# Patient Record
Sex: FEMALE | Race: WHITE | Employment: UNEMPLOYED | URBAN - METROPOLITAN AREA
[De-identification: names, ages, dates, MRNs, and addresses within clinical notes are randomized per-mention and may not be internally consistent; named-entity substitution may affect disease eponyms.]

---

## 2017-01-25 PROBLEM — J45.20 MILD INTERMITTENT ASTHMA: Status: ACTIVE | Noted: 2017-01-25

## 2017-01-25 PROBLEM — Z72.0 TOBACCO ABUSE: Status: ACTIVE | Noted: 2017-01-25

## 2017-01-25 PROBLEM — F41.9 ANXIETY: Status: ACTIVE | Noted: 2017-01-25

## 2018-07-09 ENCOUNTER — HOSPITAL ENCOUNTER (EMERGENCY)
Age: 38
Discharge: HOME | DRG: 113 | End: 2018-07-10
Attending: EMERGENCY MEDICINE
Payer: COMMERCIAL

## 2018-07-09 DIAGNOSIS — Z72.0 TOBACCO ABUSE: ICD-10-CM

## 2018-07-09 DIAGNOSIS — J44.0 COPD WITH ACUTE BRONCHITIS (HCC): Primary | ICD-10-CM

## 2018-07-09 DIAGNOSIS — J20.9 COPD WITH ACUTE BRONCHITIS (HCC): Primary | ICD-10-CM

## 2018-07-09 LAB
EKG ATRIAL RATE: 101 BPM
EKG P AXIS: 40 DEGREES
EKG P-R INTERVAL: 192 MS
EKG Q-T INTERVAL: 346 MS
EKG QRS DURATION: 88 MS
EKG QTC CALCULATION (BAZETT): 448 MS
EKG R AXIS: 23 DEGREES
EKG T AXIS: 52 DEGREES
EKG VENTRICULAR RATE: 101 BPM

## 2018-07-09 PROCEDURE — 99285 EMERGENCY DEPT VISIT HI MDM: CPT

## 2018-07-09 PROCEDURE — 93005 ELECTROCARDIOGRAM TRACING: CPT | Performed by: EMERGENCY MEDICINE

## 2018-07-09 ASSESSMENT — PAIN DESCRIPTION - LOCATION
LOCATION: CHEST
LOCATION_2: HEAD

## 2018-07-09 ASSESSMENT — PAIN DESCRIPTION - PAIN TYPE: TYPE: ACUTE PAIN

## 2018-07-09 ASSESSMENT — PAIN DESCRIPTION - ORIENTATION
ORIENTATION_2: OTHER (COMMENT)
ORIENTATION: RIGHT;LEFT

## 2018-07-09 ASSESSMENT — PAIN DESCRIPTION - DURATION: DURATION_2: CONTINUOUS

## 2018-07-09 ASSESSMENT — PAIN SCALES - GENERAL: PAINLEVEL_OUTOF10: 10

## 2018-07-09 ASSESSMENT — PAIN DESCRIPTION - DESCRIPTORS
DESCRIPTORS: SHARP
DESCRIPTORS_2: STABBING

## 2018-07-09 ASSESSMENT — PAIN DESCRIPTION - FREQUENCY: FREQUENCY: INTERMITTENT

## 2018-07-10 ENCOUNTER — APPOINTMENT (OUTPATIENT)
Dept: GENERAL RADIOLOGY | Age: 38
DRG: 113 | End: 2018-07-10
Payer: COMMERCIAL

## 2018-07-10 VITALS
DIASTOLIC BLOOD PRESSURE: 73 MMHG | OXYGEN SATURATION: 94 % | BODY MASS INDEX: 30.23 KG/M2 | HEART RATE: 101 BPM | SYSTOLIC BLOOD PRESSURE: 115 MMHG | TEMPERATURE: 98.2 F | RESPIRATION RATE: 20 BRPM | WEIGHT: 144 LBS | HEIGHT: 58 IN

## 2018-07-10 LAB
ANION GAP SERPL CALCULATED.3IONS-SCNC: 16 MMOL/L (ref 7–16)
ANISOCYTOSIS: ABNORMAL
BASOPHILS ABSOLUTE: 0.13 E9/L (ref 0–0.2)
BASOPHILS RELATIVE PERCENT: 0.9 % (ref 0–2)
BUN BLDV-MCNC: 12 MG/DL (ref 6–20)
BURR CELLS: ABNORMAL
CALCIUM SERPL-MCNC: 8.9 MG/DL (ref 8.6–10.2)
CHLORIDE BLD-SCNC: 103 MMOL/L (ref 98–107)
CO2: 22 MMOL/L (ref 22–29)
CREAT SERPL-MCNC: 1 MG/DL (ref 0.5–1)
EOSINOPHILS ABSOLUTE: 0.54 E9/L (ref 0.05–0.5)
EOSINOPHILS RELATIVE PERCENT: 3.6 % (ref 0–6)
GFR AFRICAN AMERICAN: >60
GFR NON-AFRICAN AMERICAN: >60 ML/MIN/1.73
GLUCOSE BLD-MCNC: 111 MG/DL (ref 74–109)
HCT VFR BLD CALC: 38.3 % (ref 34–48)
HEMOGLOBIN: 12.4 G/DL (ref 11.5–15.5)
IMMATURE GRANULOCYTES #: 0.13 E9/L
IMMATURE GRANULOCYTES %: 0.9 % (ref 0–5)
LACTIC ACID: 1.9 MMOL/L (ref 0.5–2.2)
LYMPHOCYTES ABSOLUTE: 4.82 E9/L (ref 1.5–4)
LYMPHOCYTES RELATIVE PERCENT: 32.4 % (ref 20–42)
MAGNESIUM: 2.2 MG/DL (ref 1.6–2.6)
MCH RBC QN AUTO: 27.1 PG (ref 26–35)
MCHC RBC AUTO-ENTMCNC: 32.4 % (ref 32–34.5)
MCV RBC AUTO: 83.6 FL (ref 80–99.9)
MONOCYTES ABSOLUTE: 1.82 E9/L (ref 0.1–0.95)
MONOCYTES RELATIVE PERCENT: 12.2 % (ref 2–12)
NEUTROPHILS ABSOLUTE: 7.42 E9/L (ref 1.8–7.3)
NEUTROPHILS RELATIVE PERCENT: 50 % (ref 43–80)
OVALOCYTES: ABNORMAL
PDW BLD-RTO: 15.4 FL (ref 11.5–15)
PLATELET # BLD: 418 E9/L (ref 130–450)
PMV BLD AUTO: 9.7 FL (ref 7–12)
POIKILOCYTES: ABNORMAL
POLYCHROMASIA: ABNORMAL
POTASSIUM SERPL-SCNC: 3.8 MMOL/L (ref 3.5–5)
PRO-BNP: 32 PG/ML (ref 0–125)
RBC # BLD: 4.58 E12/L (ref 3.5–5.5)
SODIUM BLD-SCNC: 141 MMOL/L (ref 132–146)
TROPONIN: <0.01 NG/ML (ref 0–0.03)
WBC # BLD: 14.9 E9/L (ref 4.5–11.5)

## 2018-07-10 PROCEDURE — 83605 ASSAY OF LACTIC ACID: CPT

## 2018-07-10 PROCEDURE — 84484 ASSAY OF TROPONIN QUANT: CPT

## 2018-07-10 PROCEDURE — 36415 COLL VENOUS BLD VENIPUNCTURE: CPT

## 2018-07-10 PROCEDURE — 85025 COMPLETE CBC W/AUTO DIFF WBC: CPT

## 2018-07-10 PROCEDURE — 83880 ASSAY OF NATRIURETIC PEPTIDE: CPT

## 2018-07-10 PROCEDURE — 80048 BASIC METABOLIC PNL TOTAL CA: CPT

## 2018-07-10 PROCEDURE — 6370000000 HC RX 637 (ALT 250 FOR IP): Performed by: EMERGENCY MEDICINE

## 2018-07-10 PROCEDURE — 96374 THER/PROPH/DIAG INJ IV PUSH: CPT

## 2018-07-10 PROCEDURE — 83735 ASSAY OF MAGNESIUM: CPT

## 2018-07-10 PROCEDURE — 6360000002 HC RX W HCPCS: Performed by: EMERGENCY MEDICINE

## 2018-07-10 PROCEDURE — 94640 AIRWAY INHALATION TREATMENT: CPT

## 2018-07-10 PROCEDURE — 71045 X-RAY EXAM CHEST 1 VIEW: CPT

## 2018-07-10 RX ORDER — METHYLPREDNISOLONE SODIUM SUCCINATE 125 MG/2ML
125 INJECTION, POWDER, LYOPHILIZED, FOR SOLUTION INTRAMUSCULAR; INTRAVENOUS ONCE
Status: COMPLETED | OUTPATIENT
Start: 2018-07-10 | End: 2018-07-10

## 2018-07-10 RX ORDER — PREDNISONE 50 MG/1
TABLET ORAL
Qty: 5 TABLET | Refills: 0 | Status: ON HOLD | OUTPATIENT
Start: 2018-07-10 | End: 2018-07-17 | Stop reason: HOSPADM

## 2018-07-10 RX ORDER — IPRATROPIUM BROMIDE AND ALBUTEROL SULFATE 2.5; .5 MG/3ML; MG/3ML
3 SOLUTION RESPIRATORY (INHALATION) ONCE
Status: COMPLETED | OUTPATIENT
Start: 2018-07-10 | End: 2018-07-10

## 2018-07-10 RX ORDER — LEVOFLOXACIN 500 MG/1
500 TABLET, FILM COATED ORAL DAILY
Qty: 7 TABLET | Refills: 0 | Status: ON HOLD | OUTPATIENT
Start: 2018-07-10 | End: 2018-07-17 | Stop reason: HOSPADM

## 2018-07-10 RX ORDER — LEVOFLOXACIN 500 MG/1
500 TABLET, FILM COATED ORAL ONCE
Status: COMPLETED | OUTPATIENT
Start: 2018-07-10 | End: 2018-07-10

## 2018-07-10 RX ADMIN — LEVOFLOXACIN 500 MG: 500 TABLET, FILM COATED ORAL at 03:40

## 2018-07-10 RX ADMIN — METHYLPREDNISOLONE SODIUM SUCCINATE 125 MG: 125 INJECTION, POWDER, LYOPHILIZED, FOR SOLUTION INTRAMUSCULAR; INTRAVENOUS at 00:45

## 2018-07-10 RX ADMIN — IPRATROPIUM BROMIDE AND ALBUTEROL SULFATE 3 AMPULE: 2.5; .5 SOLUTION RESPIRATORY (INHALATION) at 00:20

## 2018-07-13 ENCOUNTER — HOSPITAL ENCOUNTER (INPATIENT)
Age: 38
LOS: 6 days | Discharge: HOME HEALTH | DRG: 113 | End: 2018-07-19
Attending: INTERNAL MEDICINE | Admitting: INTERNAL MEDICINE
Payer: COMMERCIAL

## 2018-07-13 DIAGNOSIS — Z72.0 TOBACCO ABUSE: ICD-10-CM

## 2018-07-13 DIAGNOSIS — F41.9 ANXIETY: ICD-10-CM

## 2018-07-13 DIAGNOSIS — J45.22 MILD INTERMITTENT ASTHMA WITH STATUS ASTHMATICUS: ICD-10-CM

## 2018-07-13 DIAGNOSIS — J45.902 ASTHMA WITH STATUS ASTHMATICUS, UNSPECIFIED ASTHMA SEVERITY, UNSPECIFIED WHETHER PERSISTENT: Primary | ICD-10-CM

## 2018-07-13 LAB
FILM ARRAY ADENOVIRUS: ABNORMAL
FILM ARRAY BORDETELLA PERTUSSIS: ABNORMAL
FILM ARRAY CHLAMYDOPHILIA PNEUMONIAE: ABNORMAL
FILM ARRAY CORONAVIRUS 229E: ABNORMAL
FILM ARRAY CORONAVIRUS HKU1: ABNORMAL
FILM ARRAY CORONAVIRUS NL63: ABNORMAL
FILM ARRAY CORONAVIRUS OC43: ABNORMAL
FILM ARRAY INFLUENZA A VIRUS 09H1: ABNORMAL
FILM ARRAY INFLUENZA A VIRUS H1: ABNORMAL
FILM ARRAY INFLUENZA A VIRUS H3: ABNORMAL
FILM ARRAY INFLUENZA A VIRUS: ABNORMAL
FILM ARRAY INFLUENZA B: ABNORMAL
FILM ARRAY METAPNEUMOVIRUS: ABNORMAL
FILM ARRAY MYCOPLASMA PNEUMONIAE: ABNORMAL
FILM ARRAY PARAINFLUENZA VIRUS 1: ABNORMAL
FILM ARRAY PARAINFLUENZA VIRUS 2: ABNORMAL
FILM ARRAY PARAINFLUENZA VIRUS 3: ABNORMAL
FILM ARRAY PARAINFLUENZA VIRUS 4: ABNORMAL
FILM ARRAY RESPIRATORY SYNCITIAL VIRUS: ABNORMAL
ORGANISM: ABNORMAL

## 2018-07-13 PROCEDURE — 2580000003 HC RX 258: Performed by: INTERNAL MEDICINE

## 2018-07-13 PROCEDURE — 6360000002 HC RX W HCPCS: Performed by: INTERNAL MEDICINE

## 2018-07-13 PROCEDURE — 94640 AIRWAY INHALATION TREATMENT: CPT

## 2018-07-13 PROCEDURE — 87502 INFLUENZA DNA AMP PROBE: CPT

## 2018-07-13 PROCEDURE — 6370000000 HC RX 637 (ALT 250 FOR IP): Performed by: INTERNAL MEDICINE

## 2018-07-13 PROCEDURE — 87486 CHLMYD PNEUM DNA AMP PROBE: CPT

## 2018-07-13 PROCEDURE — 82785 ASSAY OF IGE: CPT

## 2018-07-13 PROCEDURE — 87503 INFLUENZA DNA AMP PROB ADDL: CPT

## 2018-07-13 PROCEDURE — 94664 DEMO&/EVAL PT USE INHALER: CPT

## 2018-07-13 PROCEDURE — 87581 M.PNEUMON DNA AMP PROBE: CPT

## 2018-07-13 PROCEDURE — 87798 DETECT AGENT NOS DNA AMP: CPT

## 2018-07-13 PROCEDURE — 1200000000 HC SEMI PRIVATE

## 2018-07-13 RX ORDER — FLUOXETINE HYDROCHLORIDE 20 MG/1
20 CAPSULE ORAL DAILY
Status: DISCONTINUED | OUTPATIENT
Start: 2018-07-13 | End: 2018-07-20 | Stop reason: HOSPADM

## 2018-07-13 RX ORDER — ONDANSETRON 2 MG/ML
4 INJECTION INTRAMUSCULAR; INTRAVENOUS EVERY 6 HOURS PRN
Status: DISCONTINUED | OUTPATIENT
Start: 2018-07-13 | End: 2018-07-20 | Stop reason: HOSPADM

## 2018-07-13 RX ORDER — TOPIRAMATE 100 MG/1
100 TABLET, FILM COATED ORAL 2 TIMES DAILY
Status: DISCONTINUED | OUTPATIENT
Start: 2018-07-13 | End: 2018-07-20 | Stop reason: HOSPADM

## 2018-07-13 RX ORDER — PSEUDOEPHEDRINE HCL 120 MG/1
120 TABLET, FILM COATED, EXTENDED RELEASE ORAL 2 TIMES DAILY
Status: DISCONTINUED | OUTPATIENT
Start: 2018-07-13 | End: 2018-07-20 | Stop reason: HOSPADM

## 2018-07-13 RX ORDER — CETIRIZINE HYDROCHLORIDE 10 MG/1
5 TABLET ORAL 2 TIMES DAILY
Status: DISCONTINUED | OUTPATIENT
Start: 2018-07-13 | End: 2018-07-20 | Stop reason: HOSPADM

## 2018-07-13 RX ORDER — METHYLPREDNISOLONE SODIUM SUCCINATE 40 MG/ML
40 INJECTION, POWDER, LYOPHILIZED, FOR SOLUTION INTRAMUSCULAR; INTRAVENOUS DAILY
Status: DISCONTINUED | OUTPATIENT
Start: 2018-07-13 | End: 2018-07-15

## 2018-07-13 RX ORDER — SODIUM CHLORIDE 0.9 % (FLUSH) 0.9 %
10 SYRINGE (ML) INJECTION PRN
Status: DISCONTINUED | OUTPATIENT
Start: 2018-07-13 | End: 2018-07-20 | Stop reason: HOSPADM

## 2018-07-13 RX ORDER — SODIUM CHLORIDE 0.9 % (FLUSH) 0.9 %
10 SYRINGE (ML) INJECTION EVERY 12 HOURS SCHEDULED
Status: DISCONTINUED | OUTPATIENT
Start: 2018-07-13 | End: 2018-07-20 | Stop reason: HOSPADM

## 2018-07-13 RX ORDER — LAMOTRIGINE 100 MG/1
200 TABLET ORAL EVERY EVENING
Status: DISCONTINUED | OUTPATIENT
Start: 2018-07-13 | End: 2018-07-20 | Stop reason: HOSPADM

## 2018-07-13 RX ORDER — TRAZODONE HYDROCHLORIDE 150 MG/1
300 TABLET ORAL NIGHTLY
Status: DISCONTINUED | OUTPATIENT
Start: 2018-07-13 | End: 2018-07-20 | Stop reason: HOSPADM

## 2018-07-13 RX ORDER — LEVALBUTEROL INHALATION SOLUTION 0.63 MG/3ML
0.63 SOLUTION RESPIRATORY (INHALATION) 3 TIMES DAILY
Status: DISCONTINUED | OUTPATIENT
Start: 2018-07-13 | End: 2018-07-20 | Stop reason: HOSPADM

## 2018-07-13 RX ORDER — IPRATROPIUM BROMIDE AND ALBUTEROL SULFATE 2.5; .5 MG/3ML; MG/3ML
1 SOLUTION RESPIRATORY (INHALATION)
Status: DISCONTINUED | OUTPATIENT
Start: 2018-07-13 | End: 2018-07-13

## 2018-07-13 RX ORDER — DIAZEPAM 5 MG/1
5 TABLET ORAL 4 TIMES DAILY
Status: DISCONTINUED | OUTPATIENT
Start: 2018-07-13 | End: 2018-07-20 | Stop reason: HOSPADM

## 2018-07-13 RX ORDER — BUDESONIDE 0.5 MG/2ML
0.5 INHALANT ORAL 2 TIMES DAILY
Status: DISCONTINUED | OUTPATIENT
Start: 2018-07-13 | End: 2018-07-17

## 2018-07-13 RX ADMIN — TRAZODONE HYDROCHLORIDE 300 MG: 150 TABLET ORAL at 20:50

## 2018-07-13 RX ADMIN — DIAZEPAM 5 MG: 5 TABLET ORAL at 17:41

## 2018-07-13 RX ADMIN — TOPIRAMATE 100 MG: 100 TABLET, FILM COATED ORAL at 20:51

## 2018-07-13 RX ADMIN — METHYLPREDNISOLONE SODIUM SUCCINATE 40 MG: 40 INJECTION, POWDER, LYOPHILIZED, FOR SOLUTION INTRAMUSCULAR; INTRAVENOUS at 17:41

## 2018-07-13 RX ADMIN — PSEUDOEPHEDRINE HYDROCHLORIDE 120 MG: 120 TABLET, FILM COATED, EXTENDED RELEASE ORAL at 20:51

## 2018-07-13 RX ADMIN — CETIRIZINE HYDROCHLORIDE 5 MG: 10 TABLET, FILM COATED ORAL at 20:51

## 2018-07-13 RX ADMIN — IPRATROPIUM BROMIDE AND ALBUTEROL SULFATE 1 AMPULE: 2.5; .5 SOLUTION RESPIRATORY (INHALATION) at 17:16

## 2018-07-13 RX ADMIN — LAMOTRIGINE 200 MG: 100 TABLET ORAL at 17:39

## 2018-07-13 RX ADMIN — BUDESONIDE 500 MCG: 0.5 SUSPENSION RESPIRATORY (INHALATION) at 21:11

## 2018-07-13 RX ADMIN — FLUOXETINE HYDROCHLORIDE 20 MG: 20 CAPSULE ORAL at 17:39

## 2018-07-13 RX ADMIN — ENOXAPARIN SODIUM 40 MG: 40 INJECTION SUBCUTANEOUS at 17:41

## 2018-07-13 RX ADMIN — Medication 10 ML: at 21:15

## 2018-07-13 RX ADMIN — DIAZEPAM 5 MG: 5 TABLET ORAL at 20:50

## 2018-07-13 RX ADMIN — LEVALBUTEROL INHALATION 0.63MG/3ML 0.63 MG: 0.63 SOLUTION RESPIRATORY (INHALATION) at 21:12

## 2018-07-14 LAB
ANION GAP SERPL CALCULATED.3IONS-SCNC: 11 MMOL/L (ref 7–16)
ANISOCYTOSIS: ABNORMAL
BASOPHILS ABSOLUTE: 0 E9/L (ref 0–0.2)
BASOPHILS RELATIVE PERCENT: 0.3 % (ref 0–2)
BUN BLDV-MCNC: 19 MG/DL (ref 6–20)
BURR CELLS: ABNORMAL
CALCIUM SERPL-MCNC: 8.5 MG/DL (ref 8.6–10.2)
CHLORIDE BLD-SCNC: 105 MMOL/L (ref 98–107)
CO2: 23 MMOL/L (ref 22–29)
CREAT SERPL-MCNC: 0.8 MG/DL (ref 0.5–1)
EOSINOPHILS ABSOLUTE: 0 E9/L (ref 0.05–0.5)
EOSINOPHILS RELATIVE PERCENT: 0.1 % (ref 0–6)
GFR AFRICAN AMERICAN: >60
GFR NON-AFRICAN AMERICAN: >60 ML/MIN/1.73
GLUCOSE BLD-MCNC: 153 MG/DL (ref 74–109)
HCT VFR BLD CALC: 36.4 % (ref 34–48)
HEMOGLOBIN: 11.9 G/DL (ref 11.5–15.5)
LYMPHOCYTES ABSOLUTE: 2.99 E9/L (ref 1.5–4)
LYMPHOCYTES RELATIVE PERCENT: 11.7 % (ref 20–42)
MCH RBC QN AUTO: 27.4 PG (ref 26–35)
MCHC RBC AUTO-ENTMCNC: 32.7 % (ref 32–34.5)
MCV RBC AUTO: 83.9 FL (ref 80–99.9)
METER GLUCOSE: 104 MG/DL (ref 70–110)
METER GLUCOSE: 140 MG/DL (ref 70–110)
METER GLUCOSE: 153 MG/DL (ref 70–110)
METER GLUCOSE: 173 MG/DL (ref 70–110)
MONOCYTES ABSOLUTE: 1 E9/L (ref 0.1–0.95)
MONOCYTES RELATIVE PERCENT: 3.6 % (ref 2–12)
NEUTROPHILS ABSOLUTE: 21.17 E9/L (ref 1.8–7.3)
NEUTROPHILS RELATIVE PERCENT: 84.7 % (ref 43–80)
PDW BLD-RTO: 15.3 FL (ref 11.5–15)
PLATELET # BLD: 437 E9/L (ref 130–450)
PMV BLD AUTO: 9.8 FL (ref 7–12)
POIKILOCYTES: ABNORMAL
POTASSIUM REFLEX MAGNESIUM: 4.4 MMOL/L (ref 3.5–5)
RBC # BLD: 4.34 E12/L (ref 3.5–5.5)
SODIUM BLD-SCNC: 139 MMOL/L (ref 132–146)
WBC # BLD: 24.9 E9/L (ref 4.5–11.5)

## 2018-07-14 PROCEDURE — 6360000002 HC RX W HCPCS: Performed by: INTERNAL MEDICINE

## 2018-07-14 PROCEDURE — 2580000003 HC RX 258: Performed by: INTERNAL MEDICINE

## 2018-07-14 PROCEDURE — 1200000000 HC SEMI PRIVATE

## 2018-07-14 PROCEDURE — 36415 COLL VENOUS BLD VENIPUNCTURE: CPT

## 2018-07-14 PROCEDURE — 80048 BASIC METABOLIC PNL TOTAL CA: CPT

## 2018-07-14 PROCEDURE — 82962 GLUCOSE BLOOD TEST: CPT

## 2018-07-14 PROCEDURE — 99255 IP/OBS CONSLTJ NEW/EST HI 80: CPT | Performed by: INTERNAL MEDICINE

## 2018-07-14 PROCEDURE — 6370000000 HC RX 637 (ALT 250 FOR IP): Performed by: INTERNAL MEDICINE

## 2018-07-14 PROCEDURE — 2700000000 HC OXYGEN THERAPY PER DAY

## 2018-07-14 PROCEDURE — 94640 AIRWAY INHALATION TREATMENT: CPT

## 2018-07-14 PROCEDURE — 85025 COMPLETE CBC W/AUTO DIFF WBC: CPT

## 2018-07-14 RX ORDER — DEXTROSE MONOHYDRATE 25 G/50ML
12.5 INJECTION, SOLUTION INTRAVENOUS PRN
Status: DISCONTINUED | OUTPATIENT
Start: 2018-07-14 | End: 2018-07-20 | Stop reason: HOSPADM

## 2018-07-14 RX ORDER — DEXTROSE MONOHYDRATE 50 MG/ML
100 INJECTION, SOLUTION INTRAVENOUS PRN
Status: DISCONTINUED | OUTPATIENT
Start: 2018-07-14 | End: 2018-07-20 | Stop reason: HOSPADM

## 2018-07-14 RX ORDER — NICOTINE POLACRILEX 4 MG
15 LOZENGE BUCCAL PRN
Status: DISCONTINUED | OUTPATIENT
Start: 2018-07-14 | End: 2018-07-20 | Stop reason: HOSPADM

## 2018-07-14 RX ORDER — ASCORBIC ACID 500 MG
500 TABLET ORAL DAILY
Status: DISCONTINUED | OUTPATIENT
Start: 2018-07-14 | End: 2018-07-20 | Stop reason: HOSPADM

## 2018-07-14 RX ADMIN — AZITHROMYCIN MONOHYDRATE 250 MG: 500 INJECTION, POWDER, LYOPHILIZED, FOR SOLUTION INTRAVENOUS at 14:48

## 2018-07-14 RX ADMIN — TRAZODONE HYDROCHLORIDE 300 MG: 150 TABLET ORAL at 20:45

## 2018-07-14 RX ADMIN — Medication 500 MG: at 16:39

## 2018-07-14 RX ADMIN — NYSTATIN 500000 UNITS: 100000 SUSPENSION ORAL at 20:45

## 2018-07-14 RX ADMIN — CETIRIZINE HYDROCHLORIDE 5 MG: 10 TABLET, FILM COATED ORAL at 20:45

## 2018-07-14 RX ADMIN — METHYLPREDNISOLONE SODIUM SUCCINATE 40 MG: 40 INJECTION, POWDER, LYOPHILIZED, FOR SOLUTION INTRAMUSCULAR; INTRAVENOUS at 10:14

## 2018-07-14 RX ADMIN — ENOXAPARIN SODIUM 40 MG: 40 INJECTION SUBCUTANEOUS at 10:10

## 2018-07-14 RX ADMIN — LEVALBUTEROL INHALATION 0.63MG/3ML 0.63 MG: 0.63 SOLUTION RESPIRATORY (INHALATION) at 10:24

## 2018-07-14 RX ADMIN — VITAMIN D, TAB 1000IU (100/BT) 1000 UNITS: 25 TAB at 10:12

## 2018-07-14 RX ADMIN — LEVALBUTEROL INHALATION 0.63MG/3ML 0.63 MG: 0.63 SOLUTION RESPIRATORY (INHALATION) at 20:10

## 2018-07-14 RX ADMIN — NYSTATIN 500000 UNITS: 100000 SUSPENSION ORAL at 16:40

## 2018-07-14 RX ADMIN — INSULIN LISPRO 1 UNITS: 100 INJECTION, SOLUTION INTRAVENOUS; SUBCUTANEOUS at 16:40

## 2018-07-14 RX ADMIN — Medication 10 ML: at 20:45

## 2018-07-14 RX ADMIN — TOPIRAMATE 100 MG: 100 TABLET, FILM COATED ORAL at 10:12

## 2018-07-14 RX ADMIN — Medication 10 ML: at 10:14

## 2018-07-14 RX ADMIN — METFORMIN HYDROCHLORIDE 500 MG: 500 TABLET ORAL at 10:12

## 2018-07-14 RX ADMIN — BUDESONIDE 500 MCG: 0.5 SUSPENSION RESPIRATORY (INHALATION) at 20:20

## 2018-07-14 RX ADMIN — BUDESONIDE 500 MCG: 0.5 SUSPENSION RESPIRATORY (INHALATION) at 06:04

## 2018-07-14 RX ADMIN — CETIRIZINE HYDROCHLORIDE 5 MG: 10 TABLET, FILM COATED ORAL at 10:13

## 2018-07-14 RX ADMIN — LAMOTRIGINE 200 MG: 100 TABLET ORAL at 16:39

## 2018-07-14 RX ADMIN — PSEUDOEPHEDRINE HYDROCHLORIDE 120 MG: 120 TABLET, FILM COATED, EXTENDED RELEASE ORAL at 20:44

## 2018-07-14 RX ADMIN — TOPIRAMATE 100 MG: 100 TABLET, FILM COATED ORAL at 20:45

## 2018-07-14 RX ADMIN — FLUOXETINE HYDROCHLORIDE 20 MG: 20 CAPSULE ORAL at 10:12

## 2018-07-14 RX ADMIN — PSEUDOEPHEDRINE HYDROCHLORIDE 120 MG: 120 TABLET, FILM COATED, EXTENDED RELEASE ORAL at 10:12

## 2018-07-14 RX ADMIN — LEVALBUTEROL INHALATION 0.63MG/3ML 0.63 MG: 0.63 SOLUTION RESPIRATORY (INHALATION) at 06:06

## 2018-07-14 RX ADMIN — INSULIN LISPRO 1 UNITS: 100 INJECTION, SOLUTION INTRAVENOUS; SUBCUTANEOUS at 10:09

## 2018-07-15 ENCOUNTER — APPOINTMENT (OUTPATIENT)
Dept: CT IMAGING | Age: 38
DRG: 113 | End: 2018-07-15
Attending: INTERNAL MEDICINE
Payer: COMMERCIAL

## 2018-07-15 LAB
HCG(URINE) PREGNANCY TEST: NEGATIVE
METER GLUCOSE: 108 MG/DL (ref 70–110)
METER GLUCOSE: 122 MG/DL (ref 70–110)
METER GLUCOSE: 140 MG/DL (ref 70–110)
METER GLUCOSE: 144 MG/DL (ref 70–110)

## 2018-07-15 PROCEDURE — 6370000000 HC RX 637 (ALT 250 FOR IP): Performed by: INTERNAL MEDICINE

## 2018-07-15 PROCEDURE — 6360000002 HC RX W HCPCS: Performed by: INTERNAL MEDICINE

## 2018-07-15 PROCEDURE — 71250 CT THORAX DX C-: CPT

## 2018-07-15 PROCEDURE — 81025 URINE PREGNANCY TEST: CPT

## 2018-07-15 PROCEDURE — 2700000000 HC OXYGEN THERAPY PER DAY

## 2018-07-15 PROCEDURE — 94640 AIRWAY INHALATION TREATMENT: CPT

## 2018-07-15 PROCEDURE — 2580000003 HC RX 258: Performed by: INTERNAL MEDICINE

## 2018-07-15 PROCEDURE — 82962 GLUCOSE BLOOD TEST: CPT

## 2018-07-15 PROCEDURE — 99232 SBSQ HOSP IP/OBS MODERATE 35: CPT | Performed by: INTERNAL MEDICINE

## 2018-07-15 PROCEDURE — 1200000000 HC SEMI PRIVATE

## 2018-07-15 RX ORDER — METHYLPREDNISOLONE SODIUM SUCCINATE 40 MG/ML
20 INJECTION, POWDER, LYOPHILIZED, FOR SOLUTION INTRAMUSCULAR; INTRAVENOUS DAILY
Status: DISCONTINUED | OUTPATIENT
Start: 2018-07-16 | End: 2018-07-16

## 2018-07-15 RX ADMIN — BUDESONIDE 500 MCG: 0.5 SUSPENSION RESPIRATORY (INHALATION) at 21:17

## 2018-07-15 RX ADMIN — LEVALBUTEROL INHALATION 0.63MG/3ML 0.63 MG: 0.63 SOLUTION RESPIRATORY (INHALATION) at 11:32

## 2018-07-15 RX ADMIN — Medication 500 MG: at 08:08

## 2018-07-15 RX ADMIN — TRAZODONE HYDROCHLORIDE 300 MG: 150 TABLET ORAL at 20:46

## 2018-07-15 RX ADMIN — PSEUDOEPHEDRINE HYDROCHLORIDE 120 MG: 120 TABLET, FILM COATED, EXTENDED RELEASE ORAL at 08:09

## 2018-07-15 RX ADMIN — DIAZEPAM 5 MG: 5 TABLET ORAL at 16:45

## 2018-07-15 RX ADMIN — CETIRIZINE HYDROCHLORIDE 5 MG: 10 TABLET, FILM COATED ORAL at 20:45

## 2018-07-15 RX ADMIN — LEVALBUTEROL INHALATION 0.63MG/3ML 0.63 MG: 0.63 SOLUTION RESPIRATORY (INHALATION) at 21:18

## 2018-07-15 RX ADMIN — MAGNESIUM HYDROXIDE 30 ML: 400 SUSPENSION ORAL at 03:27

## 2018-07-15 RX ADMIN — NYSTATIN 500000 UNITS: 100000 SUSPENSION ORAL at 16:44

## 2018-07-15 RX ADMIN — METFORMIN HYDROCHLORIDE 500 MG: 500 TABLET ORAL at 08:08

## 2018-07-15 RX ADMIN — DIAZEPAM 5 MG: 5 TABLET ORAL at 13:46

## 2018-07-15 RX ADMIN — BUDESONIDE 500 MCG: 0.5 SUSPENSION RESPIRATORY (INHALATION) at 07:51

## 2018-07-15 RX ADMIN — TOPIRAMATE 100 MG: 100 TABLET, FILM COATED ORAL at 20:45

## 2018-07-15 RX ADMIN — LAMOTRIGINE 200 MG: 100 TABLET ORAL at 16:44

## 2018-07-15 RX ADMIN — Medication 1 LOZENGE: at 20:45

## 2018-07-15 RX ADMIN — NYSTATIN 500000 UNITS: 100000 SUSPENSION ORAL at 08:09

## 2018-07-15 RX ADMIN — AZITHROMYCIN MONOHYDRATE 250 MG: 500 INJECTION, POWDER, LYOPHILIZED, FOR SOLUTION INTRAVENOUS at 14:23

## 2018-07-15 RX ADMIN — Medication 10 ML: at 08:09

## 2018-07-15 RX ADMIN — INSULIN LISPRO 1 UNITS: 100 INJECTION, SOLUTION INTRAVENOUS; SUBCUTANEOUS at 16:45

## 2018-07-15 RX ADMIN — DIAZEPAM 5 MG: 5 TABLET ORAL at 08:09

## 2018-07-15 RX ADMIN — LEVALBUTEROL INHALATION 0.63MG/3ML 0.63 MG: 0.63 SOLUTION RESPIRATORY (INHALATION) at 03:07

## 2018-07-15 RX ADMIN — VITAMIN D, TAB 1000IU (100/BT) 1000 UNITS: 25 TAB at 08:08

## 2018-07-15 RX ADMIN — DIAZEPAM 5 MG: 5 TABLET ORAL at 20:44

## 2018-07-15 RX ADMIN — FLUOXETINE HYDROCHLORIDE 20 MG: 20 CAPSULE ORAL at 08:08

## 2018-07-15 RX ADMIN — LEVALBUTEROL INHALATION 0.63MG/3ML 0.63 MG: 0.63 SOLUTION RESPIRATORY (INHALATION) at 07:51

## 2018-07-15 RX ADMIN — INSULIN LISPRO 1 UNITS: 100 INJECTION, SOLUTION INTRAVENOUS; SUBCUTANEOUS at 11:20

## 2018-07-15 RX ADMIN — PSEUDOEPHEDRINE HYDROCHLORIDE 120 MG: 120 TABLET, FILM COATED, EXTENDED RELEASE ORAL at 20:45

## 2018-07-15 RX ADMIN — METHYLPREDNISOLONE SODIUM SUCCINATE 40 MG: 40 INJECTION, POWDER, LYOPHILIZED, FOR SOLUTION INTRAMUSCULAR; INTRAVENOUS at 08:09

## 2018-07-15 RX ADMIN — TOPIRAMATE 100 MG: 100 TABLET, FILM COATED ORAL at 08:08

## 2018-07-15 RX ADMIN — NYSTATIN 500000 UNITS: 100000 SUSPENSION ORAL at 13:45

## 2018-07-15 RX ADMIN — Medication 10 ML: at 20:44

## 2018-07-15 RX ADMIN — CETIRIZINE HYDROCHLORIDE 5 MG: 10 TABLET, FILM COATED ORAL at 08:08

## 2018-07-15 RX ADMIN — ENOXAPARIN SODIUM 40 MG: 40 INJECTION SUBCUTANEOUS at 08:09

## 2018-07-15 RX ADMIN — NYSTATIN 500000 UNITS: 100000 SUSPENSION ORAL at 20:44

## 2018-07-15 ASSESSMENT — PAIN SCALES - GENERAL
PAINLEVEL_OUTOF10: 0

## 2018-07-16 LAB
IGE: 10 KU/L
METER GLUCOSE: 112 MG/DL (ref 70–110)
METER GLUCOSE: 117 MG/DL (ref 70–110)
METER GLUCOSE: 118 MG/DL (ref 70–110)
METER GLUCOSE: 122 MG/DL (ref 70–110)

## 2018-07-16 PROCEDURE — 2580000003 HC RX 258: Performed by: INTERNAL MEDICINE

## 2018-07-16 PROCEDURE — 2700000000 HC OXYGEN THERAPY PER DAY

## 2018-07-16 PROCEDURE — 6370000000 HC RX 637 (ALT 250 FOR IP): Performed by: INTERNAL MEDICINE

## 2018-07-16 PROCEDURE — 6360000002 HC RX W HCPCS: Performed by: INTERNAL MEDICINE

## 2018-07-16 PROCEDURE — C9113 INJ PANTOPRAZOLE SODIUM, VIA: HCPCS | Performed by: INTERNAL MEDICINE

## 2018-07-16 PROCEDURE — 82962 GLUCOSE BLOOD TEST: CPT

## 2018-07-16 PROCEDURE — 1200000000 HC SEMI PRIVATE

## 2018-07-16 PROCEDURE — 94726 PLETHYSMOGRAPHY LUNG VOLUMES: CPT

## 2018-07-16 PROCEDURE — 99232 SBSQ HOSP IP/OBS MODERATE 35: CPT | Performed by: INTERNAL MEDICINE

## 2018-07-16 PROCEDURE — 94640 AIRWAY INHALATION TREATMENT: CPT

## 2018-07-16 PROCEDURE — 94375 RESPIRATORY FLOW VOLUME LOOP: CPT

## 2018-07-16 PROCEDURE — 94729 DIFFUSING CAPACITY: CPT

## 2018-07-16 PROCEDURE — 94762 N-INVAS EAR/PLS OXIMTRY CONT: CPT

## 2018-07-16 RX ORDER — OXYMETAZOLINE HYDROCHLORIDE 0.05 G/100ML
2 SPRAY NASAL 2 TIMES DAILY
Status: COMPLETED | OUTPATIENT
Start: 2018-07-16 | End: 2018-07-17

## 2018-07-16 RX ORDER — PANTOPRAZOLE SODIUM 40 MG/10ML
40 INJECTION, POWDER, LYOPHILIZED, FOR SOLUTION INTRAVENOUS
Status: DISCONTINUED | OUTPATIENT
Start: 2018-07-16 | End: 2018-07-20 | Stop reason: HOSPADM

## 2018-07-16 RX ORDER — 0.9 % SODIUM CHLORIDE 0.9 %
10 VIAL (ML) INJECTION
Status: DISCONTINUED | OUTPATIENT
Start: 2018-07-16 | End: 2018-07-20 | Stop reason: HOSPADM

## 2018-07-16 RX ORDER — ESOMEPRAZOLE SODIUM 40 MG/5ML
40 INJECTION INTRAVENOUS
Status: DISCONTINUED | OUTPATIENT
Start: 2018-07-16 | End: 2018-07-16 | Stop reason: RX

## 2018-07-16 RX ORDER — FLUTICASONE FUROATE AND VILANTEROL 100; 25 UG/1; UG/1
1 POWDER RESPIRATORY (INHALATION) DAILY
Status: DISCONTINUED | OUTPATIENT
Start: 2018-07-16 | End: 2018-07-16 | Stop reason: RX

## 2018-07-16 RX ADMIN — DIAZEPAM 5 MG: 5 TABLET ORAL at 20:41

## 2018-07-16 RX ADMIN — LEVALBUTEROL INHALATION 0.63MG/3ML 0.63 MG: 0.63 SOLUTION RESPIRATORY (INHALATION) at 01:00

## 2018-07-16 RX ADMIN — BUDESONIDE 500 MCG: 0.5 SUSPENSION RESPIRATORY (INHALATION) at 08:47

## 2018-07-16 RX ADMIN — PSEUDOEPHEDRINE HYDROCHLORIDE 120 MG: 120 TABLET, FILM COATED, EXTENDED RELEASE ORAL at 20:42

## 2018-07-16 RX ADMIN — LEVALBUTEROL INHALATION 0.63MG/3ML 0.63 MG: 0.63 SOLUTION RESPIRATORY (INHALATION) at 20:30

## 2018-07-16 RX ADMIN — TRAZODONE HYDROCHLORIDE 300 MG: 150 TABLET ORAL at 20:41

## 2018-07-16 RX ADMIN — LEVALBUTEROL INHALATION 0.63MG/3ML 0.63 MG: 0.63 SOLUTION RESPIRATORY (INHALATION) at 08:47

## 2018-07-16 RX ADMIN — CETIRIZINE HYDROCHLORIDE 5 MG: 10 TABLET, FILM COATED ORAL at 09:27

## 2018-07-16 RX ADMIN — MOMETASONE FUROATE AND FORMOTEROL FUMARATE DIHYDRATE 2 PUFF: 200; 5 AEROSOL RESPIRATORY (INHALATION) at 20:41

## 2018-07-16 RX ADMIN — MAGNESIUM HYDROXIDE 30 ML: 400 SUSPENSION ORAL at 00:16

## 2018-07-16 RX ADMIN — ENOXAPARIN SODIUM 40 MG: 40 INJECTION SUBCUTANEOUS at 09:23

## 2018-07-16 RX ADMIN — NYSTATIN 500000 UNITS: 100000 SUSPENSION ORAL at 20:42

## 2018-07-16 RX ADMIN — METFORMIN HYDROCHLORIDE 500 MG: 500 TABLET ORAL at 09:29

## 2018-07-16 RX ADMIN — LEVALBUTEROL INHALATION 0.63MG/3ML 0.63 MG: 0.63 SOLUTION RESPIRATORY (INHALATION) at 12:45

## 2018-07-16 RX ADMIN — OXYMETAZOLINE HYDROCHLORIDE 2 SPRAY: 0.05 SPRAY NASAL at 20:41

## 2018-07-16 RX ADMIN — DIAZEPAM 5 MG: 5 TABLET ORAL at 13:24

## 2018-07-16 RX ADMIN — AZITHROMYCIN MONOHYDRATE 250 MG: 500 INJECTION, POWDER, LYOPHILIZED, FOR SOLUTION INTRAVENOUS at 13:27

## 2018-07-16 RX ADMIN — NYSTATIN 500000 UNITS: 100000 SUSPENSION ORAL at 13:25

## 2018-07-16 RX ADMIN — OXYMETAZOLINE HYDROCHLORIDE 2 SPRAY: 0.05 SPRAY NASAL at 13:25

## 2018-07-16 RX ADMIN — Medication 10 ML: at 20:44

## 2018-07-16 RX ADMIN — DIAZEPAM 5 MG: 5 TABLET ORAL at 16:25

## 2018-07-16 RX ADMIN — NYSTATIN 500000 UNITS: 100000 SUSPENSION ORAL at 09:28

## 2018-07-16 RX ADMIN — DIAZEPAM 5 MG: 5 TABLET ORAL at 09:28

## 2018-07-16 RX ADMIN — FLUOXETINE HYDROCHLORIDE 20 MG: 20 CAPSULE ORAL at 09:28

## 2018-07-16 RX ADMIN — PANTOPRAZOLE SODIUM 40 MG: 40 INJECTION, POWDER, FOR SOLUTION INTRAVENOUS at 16:24

## 2018-07-16 RX ADMIN — SODIUM CHLORIDE 10 ML: 9 INJECTION INTRAMUSCULAR; INTRAVENOUS; SUBCUTANEOUS at 16:00

## 2018-07-16 RX ADMIN — Medication 10 ML: at 13:30

## 2018-07-16 RX ADMIN — METHYLPREDNISOLONE SODIUM SUCCINATE 20 MG: 40 INJECTION, POWDER, FOR SOLUTION INTRAMUSCULAR; INTRAVENOUS at 09:24

## 2018-07-16 RX ADMIN — LAMOTRIGINE 200 MG: 100 TABLET ORAL at 18:29

## 2018-07-16 RX ADMIN — TOPIRAMATE 100 MG: 100 TABLET, FILM COATED ORAL at 09:29

## 2018-07-16 RX ADMIN — CETIRIZINE HYDROCHLORIDE 5 MG: 10 TABLET, FILM COATED ORAL at 20:41

## 2018-07-16 RX ADMIN — PSEUDOEPHEDRINE HYDROCHLORIDE 120 MG: 120 TABLET, FILM COATED, EXTENDED RELEASE ORAL at 09:29

## 2018-07-16 RX ADMIN — BUDESONIDE 500 MCG: 0.5 SUSPENSION RESPIRATORY (INHALATION) at 20:31

## 2018-07-16 RX ADMIN — Medication 500 MG: at 09:27

## 2018-07-16 RX ADMIN — Medication 10 ML: at 09:25

## 2018-07-16 RX ADMIN — TOPIRAMATE 100 MG: 100 TABLET, FILM COATED ORAL at 20:42

## 2018-07-16 RX ADMIN — VITAMIN D, TAB 1000IU (100/BT) 1000 UNITS: 25 TAB at 09:27

## 2018-07-16 RX ADMIN — NYSTATIN 500000 UNITS: 100000 SUSPENSION ORAL at 16:25

## 2018-07-16 ASSESSMENT — PAIN SCALES - GENERAL
PAINLEVEL_OUTOF10: 0
PAINLEVEL_OUTOF10: 0

## 2018-07-17 ENCOUNTER — APPOINTMENT (OUTPATIENT)
Dept: GENERAL RADIOLOGY | Age: 38
DRG: 113 | End: 2018-07-17
Attending: INTERNAL MEDICINE
Payer: COMMERCIAL

## 2018-07-17 LAB
METER GLUCOSE: 103 MG/DL (ref 70–110)
METER GLUCOSE: 110 MG/DL (ref 70–110)
METER GLUCOSE: 96 MG/DL (ref 70–110)

## 2018-07-17 PROCEDURE — 94640 AIRWAY INHALATION TREATMENT: CPT

## 2018-07-17 PROCEDURE — 6370000000 HC RX 637 (ALT 250 FOR IP): Performed by: INTERNAL MEDICINE

## 2018-07-17 PROCEDURE — 6360000002 HC RX W HCPCS: Performed by: INTERNAL MEDICINE

## 2018-07-17 PROCEDURE — C9113 INJ PANTOPRAZOLE SODIUM, VIA: HCPCS | Performed by: INTERNAL MEDICINE

## 2018-07-17 PROCEDURE — 2500000003 HC RX 250 WO HCPCS: Performed by: INTERNAL MEDICINE

## 2018-07-17 PROCEDURE — 99233 SBSQ HOSP IP/OBS HIGH 50: CPT | Performed by: INTERNAL MEDICINE

## 2018-07-17 PROCEDURE — 2580000003 HC RX 258: Performed by: INTERNAL MEDICINE

## 2018-07-17 PROCEDURE — 82962 GLUCOSE BLOOD TEST: CPT

## 2018-07-17 PROCEDURE — 74220 X-RAY XM ESOPHAGUS 1CNTRST: CPT

## 2018-07-17 PROCEDURE — 1200000000 HC SEMI PRIVATE

## 2018-07-17 PROCEDURE — 2700000000 HC OXYGEN THERAPY PER DAY

## 2018-07-17 RX ORDER — FAMOTIDINE 40 MG/1
40 TABLET, FILM COATED ORAL EVERY EVENING
Qty: 30 TABLET | Refills: 3 | Status: SHIPPED | OUTPATIENT
Start: 2018-07-17 | End: 2018-09-06

## 2018-07-17 RX ADMIN — CETIRIZINE HYDROCHLORIDE 5 MG: 10 TABLET, FILM COATED ORAL at 10:00

## 2018-07-17 RX ADMIN — VITAMIN D, TAB 1000IU (100/BT) 1000 UNITS: 25 TAB at 10:00

## 2018-07-17 RX ADMIN — NYSTATIN 500000 UNITS: 100000 SUSPENSION ORAL at 14:29

## 2018-07-17 RX ADMIN — NYSTATIN 500000 UNITS: 100000 SUSPENSION ORAL at 17:33

## 2018-07-17 RX ADMIN — DIAZEPAM 5 MG: 5 TABLET ORAL at 22:24

## 2018-07-17 RX ADMIN — FLUOXETINE HYDROCHLORIDE 20 MG: 20 CAPSULE ORAL at 10:00

## 2018-07-17 RX ADMIN — LEVALBUTEROL INHALATION 0.63MG/3ML 0.63 MG: 0.63 SOLUTION RESPIRATORY (INHALATION) at 21:01

## 2018-07-17 RX ADMIN — BUDESONIDE 500 MCG: 0.5 SUSPENSION RESPIRATORY (INHALATION) at 07:47

## 2018-07-17 RX ADMIN — PANTOPRAZOLE SODIUM 40 MG: 40 INJECTION, POWDER, FOR SOLUTION INTRAVENOUS at 06:52

## 2018-07-17 RX ADMIN — PANTOPRAZOLE SODIUM 40 MG: 40 INJECTION, POWDER, FOR SOLUTION INTRAVENOUS at 20:04

## 2018-07-17 RX ADMIN — DIAZEPAM 5 MG: 5 TABLET ORAL at 09:58

## 2018-07-17 RX ADMIN — Medication 10 ML: at 06:53

## 2018-07-17 RX ADMIN — LEVALBUTEROL INHALATION 0.63MG/3ML 0.63 MG: 0.63 SOLUTION RESPIRATORY (INHALATION) at 11:45

## 2018-07-17 RX ADMIN — OXYMETAZOLINE HYDROCHLORIDE 2 SPRAY: 0.05 SPRAY NASAL at 22:18

## 2018-07-17 RX ADMIN — Medication 10 ML: at 10:02

## 2018-07-17 RX ADMIN — PSEUDOEPHEDRINE HYDROCHLORIDE 120 MG: 120 TABLET, FILM COATED, EXTENDED RELEASE ORAL at 22:19

## 2018-07-17 RX ADMIN — MOMETASONE FUROATE AND FORMOTEROL FUMARATE DIHYDRATE 2 PUFF: 200; 5 AEROSOL RESPIRATORY (INHALATION) at 10:04

## 2018-07-17 RX ADMIN — SALINE NASAL SPRAY 2 SPRAY: 1.5 SOLUTION NASAL at 10:08

## 2018-07-17 RX ADMIN — LEVALBUTEROL INHALATION 0.63MG/3ML 0.63 MG: 0.63 SOLUTION RESPIRATORY (INHALATION) at 07:47

## 2018-07-17 RX ADMIN — CETIRIZINE HYDROCHLORIDE 5 MG: 10 TABLET, FILM COATED ORAL at 22:19

## 2018-07-17 RX ADMIN — LAMOTRIGINE 200 MG: 100 TABLET ORAL at 17:33

## 2018-07-17 RX ADMIN — OXYMETAZOLINE HYDROCHLORIDE 2 SPRAY: 0.05 SPRAY NASAL at 10:08

## 2018-07-17 RX ADMIN — NYSTATIN 500000 UNITS: 100000 SUSPENSION ORAL at 22:24

## 2018-07-17 RX ADMIN — MOMETASONE FUROATE AND FORMOTEROL FUMARATE DIHYDRATE 2 PUFF: 200; 5 AEROSOL RESPIRATORY (INHALATION) at 20:04

## 2018-07-17 RX ADMIN — Medication 500 MG: at 10:00

## 2018-07-17 RX ADMIN — METFORMIN HYDROCHLORIDE 500 MG: 500 TABLET ORAL at 10:00

## 2018-07-17 RX ADMIN — TOPIRAMATE 100 MG: 100 TABLET, FILM COATED ORAL at 10:00

## 2018-07-17 RX ADMIN — PSEUDOEPHEDRINE HYDROCHLORIDE 120 MG: 120 TABLET, FILM COATED, EXTENDED RELEASE ORAL at 09:59

## 2018-07-17 RX ADMIN — NYSTATIN 500000 UNITS: 100000 SUSPENSION ORAL at 09:59

## 2018-07-17 RX ADMIN — ENOXAPARIN SODIUM 40 MG: 40 INJECTION SUBCUTANEOUS at 09:59

## 2018-07-17 RX ADMIN — DIAZEPAM 5 MG: 5 TABLET ORAL at 17:33

## 2018-07-17 RX ADMIN — AZITHROMYCIN MONOHYDRATE 250 MG: 500 INJECTION, POWDER, LYOPHILIZED, FOR SOLUTION INTRAVENOUS at 14:46

## 2018-07-17 RX ADMIN — DIAZEPAM 5 MG: 5 TABLET ORAL at 14:29

## 2018-07-17 RX ADMIN — SALINE NASAL SPRAY 2 SPRAY: 1.5 SOLUTION NASAL at 09:59

## 2018-07-17 RX ADMIN — TOPIRAMATE 100 MG: 100 TABLET, FILM COATED ORAL at 22:20

## 2018-07-17 RX ADMIN — BARIUM SULFATE 58 G: 960 POWDER, FOR SUSPENSION ORAL at 09:20

## 2018-07-17 RX ADMIN — TRAZODONE HYDROCHLORIDE 300 MG: 150 TABLET ORAL at 22:20

## 2018-07-17 RX ADMIN — BARIUM SULFATE 45 G: 0.6 CREAM ORAL at 09:20

## 2018-07-17 ASSESSMENT — PAIN SCALES - GENERAL
PAINLEVEL_OUTOF10: 0
PAINLEVEL_OUTOF10: 0

## 2018-07-18 LAB
METER GLUCOSE: 108 MG/DL (ref 70–110)
METER GLUCOSE: 143 MG/DL (ref 70–110)
METER GLUCOSE: 96 MG/DL (ref 70–110)
METER GLUCOSE: 98 MG/DL (ref 70–110)

## 2018-07-18 PROCEDURE — 6370000000 HC RX 637 (ALT 250 FOR IP): Performed by: INTERNAL MEDICINE

## 2018-07-18 PROCEDURE — 6360000002 HC RX W HCPCS: Performed by: INTERNAL MEDICINE

## 2018-07-18 PROCEDURE — 94640 AIRWAY INHALATION TREATMENT: CPT

## 2018-07-18 PROCEDURE — C9113 INJ PANTOPRAZOLE SODIUM, VIA: HCPCS | Performed by: INTERNAL MEDICINE

## 2018-07-18 PROCEDURE — 1200000000 HC SEMI PRIVATE

## 2018-07-18 PROCEDURE — 2580000003 HC RX 258: Performed by: INTERNAL MEDICINE

## 2018-07-18 PROCEDURE — 99999 PR OFFICE/OUTPT VISIT,PROCEDURE ONLY: CPT | Performed by: INTERNAL MEDICINE

## 2018-07-18 PROCEDURE — 99233 SBSQ HOSP IP/OBS HIGH 50: CPT | Performed by: INTERNAL MEDICINE

## 2018-07-18 PROCEDURE — 2700000000 HC OXYGEN THERAPY PER DAY

## 2018-07-18 PROCEDURE — 82962 GLUCOSE BLOOD TEST: CPT

## 2018-07-18 RX ADMIN — DIAZEPAM 5 MG: 5 TABLET ORAL at 08:59

## 2018-07-18 RX ADMIN — NYSTATIN 500000 UNITS: 100000 SUSPENSION ORAL at 16:41

## 2018-07-18 RX ADMIN — LEVALBUTEROL INHALATION 0.63MG/3ML 0.63 MG: 0.63 SOLUTION RESPIRATORY (INHALATION) at 12:58

## 2018-07-18 RX ADMIN — Medication 10 ML: at 13:33

## 2018-07-18 RX ADMIN — NYSTATIN 500000 UNITS: 100000 SUSPENSION ORAL at 08:58

## 2018-07-18 RX ADMIN — NYSTATIN 500000 UNITS: 100000 SUSPENSION ORAL at 20:42

## 2018-07-18 RX ADMIN — DIAZEPAM 5 MG: 5 TABLET ORAL at 13:08

## 2018-07-18 RX ADMIN — TOPIRAMATE 100 MG: 100 TABLET, FILM COATED ORAL at 08:57

## 2018-07-18 RX ADMIN — ENOXAPARIN SODIUM 40 MG: 40 INJECTION SUBCUTANEOUS at 08:54

## 2018-07-18 RX ADMIN — TRAZODONE HYDROCHLORIDE 300 MG: 150 TABLET ORAL at 20:43

## 2018-07-18 RX ADMIN — DIAZEPAM 5 MG: 5 TABLET ORAL at 20:42

## 2018-07-18 RX ADMIN — SODIUM CHLORIDE 10 ML: 9 INJECTION INTRAMUSCULAR; INTRAVENOUS; SUBCUTANEOUS at 16:41

## 2018-07-18 RX ADMIN — PANTOPRAZOLE SODIUM 40 MG: 40 INJECTION, POWDER, FOR SOLUTION INTRAVENOUS at 16:41

## 2018-07-18 RX ADMIN — FLUOXETINE HYDROCHLORIDE 20 MG: 20 CAPSULE ORAL at 08:57

## 2018-07-18 RX ADMIN — TOPIRAMATE 100 MG: 100 TABLET, FILM COATED ORAL at 20:43

## 2018-07-18 RX ADMIN — CETIRIZINE HYDROCHLORIDE 5 MG: 10 TABLET, FILM COATED ORAL at 08:56

## 2018-07-18 RX ADMIN — PSEUDOEPHEDRINE HYDROCHLORIDE 120 MG: 120 TABLET, FILM COATED, EXTENDED RELEASE ORAL at 20:43

## 2018-07-18 RX ADMIN — Medication 10 ML: at 20:42

## 2018-07-18 RX ADMIN — LAMOTRIGINE 200 MG: 100 TABLET ORAL at 18:11

## 2018-07-18 RX ADMIN — PANTOPRAZOLE SODIUM 40 MG: 40 INJECTION, POWDER, FOR SOLUTION INTRAVENOUS at 06:29

## 2018-07-18 RX ADMIN — LEVALBUTEROL INHALATION 0.63MG/3ML 0.63 MG: 0.63 SOLUTION RESPIRATORY (INHALATION) at 20:35

## 2018-07-18 RX ADMIN — PSEUDOEPHEDRINE HYDROCHLORIDE 120 MG: 120 TABLET, FILM COATED, EXTENDED RELEASE ORAL at 08:55

## 2018-07-18 RX ADMIN — LEVALBUTEROL INHALATION 0.63MG/3ML 0.63 MG: 0.63 SOLUTION RESPIRATORY (INHALATION) at 08:51

## 2018-07-18 RX ADMIN — NYSTATIN 500000 UNITS: 100000 SUSPENSION ORAL at 13:08

## 2018-07-18 RX ADMIN — MOMETASONE FUROATE AND FORMOTEROL FUMARATE DIHYDRATE 2 PUFF: 200; 5 AEROSOL RESPIRATORY (INHALATION) at 08:58

## 2018-07-18 RX ADMIN — DIAZEPAM 5 MG: 5 TABLET ORAL at 16:41

## 2018-07-18 RX ADMIN — CETIRIZINE HYDROCHLORIDE 5 MG: 10 TABLET, FILM COATED ORAL at 20:43

## 2018-07-18 RX ADMIN — MOMETASONE FUROATE AND FORMOTEROL FUMARATE DIHYDRATE 2 PUFF: 200; 5 AEROSOL RESPIRATORY (INHALATION) at 20:45

## 2018-07-18 RX ADMIN — METFORMIN HYDROCHLORIDE 500 MG: 500 TABLET ORAL at 08:57

## 2018-07-18 RX ADMIN — VITAMIN D, TAB 1000IU (100/BT) 1000 UNITS: 25 TAB at 08:56

## 2018-07-18 RX ADMIN — SODIUM CHLORIDE 10 ML: 9 INJECTION INTRAMUSCULAR; INTRAVENOUS; SUBCUTANEOUS at 06:29

## 2018-07-18 RX ADMIN — AZITHROMYCIN MONOHYDRATE 250 MG: 500 INJECTION, POWDER, LYOPHILIZED, FOR SOLUTION INTRAVENOUS at 13:32

## 2018-07-18 RX ADMIN — Medication 500 MG: at 08:55

## 2018-07-18 RX ADMIN — MAGESIUM CITRATE 296 ML: 1.75 LIQUID ORAL at 08:54

## 2018-07-18 ASSESSMENT — PAIN SCALES - GENERAL
PAINLEVEL_OUTOF10: 0

## 2018-07-19 VITALS
SYSTOLIC BLOOD PRESSURE: 96 MMHG | BODY MASS INDEX: 29.47 KG/M2 | HEART RATE: 83 BPM | TEMPERATURE: 97.2 F | DIASTOLIC BLOOD PRESSURE: 62 MMHG | RESPIRATION RATE: 18 BRPM | OXYGEN SATURATION: 98 % | WEIGHT: 141 LBS

## 2018-07-19 LAB
METER GLUCOSE: 114 MG/DL (ref 70–110)
METER GLUCOSE: 122 MG/DL (ref 70–110)

## 2018-07-19 PROCEDURE — 2700000000 HC OXYGEN THERAPY PER DAY

## 2018-07-19 PROCEDURE — 82962 GLUCOSE BLOOD TEST: CPT

## 2018-07-19 PROCEDURE — 94640 AIRWAY INHALATION TREATMENT: CPT

## 2018-07-19 PROCEDURE — 99232 SBSQ HOSP IP/OBS MODERATE 35: CPT | Performed by: INTERNAL MEDICINE

## 2018-07-19 PROCEDURE — 6360000002 HC RX W HCPCS: Performed by: INTERNAL MEDICINE

## 2018-07-19 PROCEDURE — 6370000000 HC RX 637 (ALT 250 FOR IP): Performed by: INTERNAL MEDICINE

## 2018-07-19 PROCEDURE — 2580000003 HC RX 258: Performed by: INTERNAL MEDICINE

## 2018-07-19 PROCEDURE — C9113 INJ PANTOPRAZOLE SODIUM, VIA: HCPCS | Performed by: INTERNAL MEDICINE

## 2018-07-19 RX ADMIN — Medication 10 ML: at 09:56

## 2018-07-19 RX ADMIN — Medication 500 MG: at 09:57

## 2018-07-19 RX ADMIN — ENOXAPARIN SODIUM 40 MG: 40 INJECTION SUBCUTANEOUS at 09:56

## 2018-07-19 RX ADMIN — FLUOXETINE HYDROCHLORIDE 20 MG: 20 CAPSULE ORAL at 09:57

## 2018-07-19 RX ADMIN — LEVALBUTEROL INHALATION 0.63MG/3ML 0.63 MG: 0.63 SOLUTION RESPIRATORY (INHALATION) at 12:01

## 2018-07-19 RX ADMIN — LEVALBUTEROL INHALATION 0.63MG/3ML 0.63 MG: 0.63 SOLUTION RESPIRATORY (INHALATION) at 08:10

## 2018-07-19 RX ADMIN — NYSTATIN 500000 UNITS: 100000 SUSPENSION ORAL at 13:22

## 2018-07-19 RX ADMIN — NYSTATIN 500000 UNITS: 100000 SUSPENSION ORAL at 09:56

## 2018-07-19 RX ADMIN — AZITHROMYCIN MONOHYDRATE 250 MG: 500 INJECTION, POWDER, LYOPHILIZED, FOR SOLUTION INTRAVENOUS at 13:22

## 2018-07-19 RX ADMIN — METFORMIN HYDROCHLORIDE 500 MG: 500 TABLET ORAL at 09:57

## 2018-07-19 RX ADMIN — DIAZEPAM 5 MG: 5 TABLET ORAL at 09:56

## 2018-07-19 RX ADMIN — PANTOPRAZOLE SODIUM 40 MG: 40 INJECTION, POWDER, FOR SOLUTION INTRAVENOUS at 06:06

## 2018-07-19 RX ADMIN — DIAZEPAM 5 MG: 5 TABLET ORAL at 13:21

## 2018-07-19 RX ADMIN — PSEUDOEPHEDRINE HYDROCHLORIDE 120 MG: 120 TABLET, FILM COATED, EXTENDED RELEASE ORAL at 09:56

## 2018-07-19 RX ADMIN — CETIRIZINE HYDROCHLORIDE 5 MG: 10 TABLET, FILM COATED ORAL at 09:57

## 2018-07-19 RX ADMIN — VITAMIN D, TAB 1000IU (100/BT) 1000 UNITS: 25 TAB at 09:57

## 2018-07-19 RX ADMIN — MOMETASONE FUROATE AND FORMOTEROL FUMARATE DIHYDRATE 2 PUFF: 200; 5 AEROSOL RESPIRATORY (INHALATION) at 10:09

## 2018-07-19 RX ADMIN — TOPIRAMATE 100 MG: 100 TABLET, FILM COATED ORAL at 09:57

## 2018-07-19 RX ADMIN — SODIUM CHLORIDE 10 ML: 9 INJECTION INTRAMUSCULAR; INTRAVENOUS; SUBCUTANEOUS at 06:06

## 2018-09-19 ENCOUNTER — TELEPHONE (OUTPATIENT)
Dept: SURGERY | Age: 38
End: 2018-09-19

## 2018-09-24 PROBLEM — K21.9 GASTROESOPHAGEAL REFLUX DISEASE WITHOUT ESOPHAGITIS: Status: ACTIVE | Noted: 2018-09-24

## 2018-10-05 ENCOUNTER — TELEPHONE (OUTPATIENT)
Dept: SURGERY | Age: 38
End: 2018-10-05

## 2018-10-29 ENCOUNTER — TELEPHONE (OUTPATIENT)
Dept: SURGERY | Age: 38
End: 2018-10-29

## 2019-04-05 RX ORDER — MOMETASONE FUROATE AND FORMOTEROL FUMARATE DIHYDRATE 200; 5 UG/1; UG/1
AEROSOL RESPIRATORY (INHALATION)
Qty: 13 G | Refills: 10 | Status: SHIPPED
Start: 2019-04-05 | End: 2020-03-02

## 2020-03-02 RX ORDER — MOMETASONE FUROATE AND FORMOTEROL FUMARATE DIHYDRATE 200; 5 UG/1; UG/1
AEROSOL RESPIRATORY (INHALATION)
Qty: 13 G | Refills: 11 | Status: SHIPPED
Start: 2020-03-02 | End: 2020-03-25

## 2021-04-01 ENCOUNTER — IMMUNIZATION (OUTPATIENT)
Dept: PRIMARY CARE CLINIC | Age: 41
End: 2021-04-01
Payer: COMMERCIAL

## 2021-04-01 PROCEDURE — 0011A COVID-19, MODERNA VACCINE 100MCG/0.5ML DOSE: CPT | Performed by: NURSE PRACTITIONER

## 2021-04-01 PROCEDURE — 91301 COVID-19, MODERNA VACCINE 100MCG/0.5ML DOSE: CPT | Performed by: NURSE PRACTITIONER

## 2021-04-29 ENCOUNTER — IMMUNIZATION (OUTPATIENT)
Dept: PRIMARY CARE CLINIC | Age: 41
End: 2021-04-29
Payer: COMMERCIAL

## 2021-04-29 PROCEDURE — 0012A COVID-19, MODERNA VACCINE 100MCG/0.5ML DOSE: CPT | Performed by: NURSE PRACTITIONER

## 2021-04-29 PROCEDURE — 91301 COVID-19, MODERNA VACCINE 100MCG/0.5ML DOSE: CPT | Performed by: NURSE PRACTITIONER

## 2023-03-20 ENCOUNTER — PREP FOR PROCEDURE (OUTPATIENT)
Dept: SURGERY | Age: 43
End: 2023-03-20

## 2023-03-20 ENCOUNTER — OFFICE VISIT (OUTPATIENT)
Dept: SURGERY | Age: 43
End: 2023-03-20
Payer: COMMERCIAL

## 2023-03-20 ENCOUNTER — TELEPHONE (OUTPATIENT)
Dept: SURGERY | Age: 43
End: 2023-03-20

## 2023-03-20 VITALS
SYSTOLIC BLOOD PRESSURE: 122 MMHG | OXYGEN SATURATION: 95 % | HEIGHT: 59 IN | HEART RATE: 97 BPM | RESPIRATION RATE: 16 BRPM | WEIGHT: 142 LBS | DIASTOLIC BLOOD PRESSURE: 81 MMHG | BODY MASS INDEX: 28.63 KG/M2

## 2023-03-20 DIAGNOSIS — R12 HEARTBURN: Primary | ICD-10-CM

## 2023-03-20 DIAGNOSIS — K30 INDIGESTION: ICD-10-CM

## 2023-03-20 DIAGNOSIS — R11.10 REGURGITATION OF FOOD: ICD-10-CM

## 2023-03-20 DIAGNOSIS — R10.10 UPPER ABDOMINAL PAIN: ICD-10-CM

## 2023-03-20 DIAGNOSIS — R14.2 BELCHING: ICD-10-CM

## 2023-03-20 PROCEDURE — G8427 DOCREV CUR MEDS BY ELIG CLIN: HCPCS | Performed by: SURGERY

## 2023-03-20 PROCEDURE — G8484 FLU IMMUNIZE NO ADMIN: HCPCS | Performed by: SURGERY

## 2023-03-20 PROCEDURE — G8419 CALC BMI OUT NRM PARAM NOF/U: HCPCS | Performed by: SURGERY

## 2023-03-20 PROCEDURE — 99243 OFF/OP CNSLTJ NEW/EST LOW 30: CPT | Performed by: SURGERY

## 2023-03-20 PROCEDURE — 99202 OFFICE O/P NEW SF 15 MIN: CPT | Performed by: SURGERY

## 2023-03-20 RX ORDER — ARIPIPRAZOLE LAUROXIL 1064 MG/3.9ML
INJECTION, SUSPENSION, EXTENDED RELEASE INTRAMUSCULAR
COMMUNITY
Start: 2023-03-15 | End: 2023-03-22

## 2023-03-20 NOTE — LETTER
March 20, 2023      Tonya Earl MD  [address] [address]  [place],  [address] [facility]      Patient: Mesfin Perez   MR Number: [id number]   YOB: 1980   Date of Visit: 3/20/2023       Dear Dr. Margaret Hernandez:    Thank you for referring Arlette Levy to me for evaluation/treatment. Below are the relevant portions of my assessment and plan of care. If you have questions, please do not hesitate to call me. I look forward to following Lamont Ruiz along with you.     Sincerely,        Grecia Silver MD    CC providers:  Tonya Earl MD  [address] [facility] [phone]  [address]

## 2023-03-20 NOTE — PROGRESS NOTES
CC:  abd pain    HPI:  pt states she has a bulge that goes from her umbilicus up to top of stomach. Pt states she has regurgitation of food after eating. Pt reports increased heartburn and belching. Pt states no association with particular foods--happens with all of the foods. Pt states she has nausea and upset stomach all of the time. Reports she was given pantoprazole but even that did not help with her symptoms. Reports occasional constipation. Denies blood in stool or black stools. Past Medical History:   Diagnosis Date    Anxiety     Asthma     Bipolar disorder ([facility]     Depression     Diabetes mellitus ([facility]     Neurofibromatosis ([facility]     Obesity     Sleep apnea        Past Surgical History:   Procedure Laterality Date     SECTION      FOOT SURGERY      TUBAL LIGATION      WISDOM TOOTH EXTRACTION         Current Outpatient Medications   Medication Sig Dispense Refill    ARISTADA 1064 MG/3.9ML PRSY injection       nicotine (NICODERM CQ) 14 MG/24HR APPLY 1 PATCH TO SKIN ONCE DAILY FOR 14 DAYS 30 patch 10    buPROPion (WELLBUTRIN) 100 MG tablet Take 100 mg by mouth daily      ARIPiprazole ER (ABILIFY MAINTENA) 400 MG SRER Inject 400 mg into the muscle every 30 days      benztropine mesylate (COGENTIN) 1 MG/ML injection Inject 1 mg into the muscle every 30 days      LORATADINE-D 12HR 5-120 MG per extended release tablet TAKE ONE (1) TABLET BY MOUTH TWICE DAILY 60 tablet 5     No current facility-administered medications for this visit.        Allergies   Allergen Reactions    Flagyl [Metronidazole]     Tetracyclines & Related        Family History   Problem Relation Age of Onset    Breast Cancer Mother     Diabetes Mother     Hypotension Mother     Dementia Mother     No Known Problems Father        Social History     Socioeconomic History    Marital status: Single     Spouse name: Not on file    Number of children: Not on file    Years of education: Not on file    Highest education level: Not

## 2023-03-22 NOTE — PROGRESS NOTES
[address]   PRE-ADMISSION TESTING GENERAL INSTRUCTIONS  [facility] Phone Number: [phone]      GENERAL INSTRUCTIONS:    [x] Antibacterial Soap Shower Night before and/or AM of surgery. [x] Do not wear makeup, lotions, powders, deodorant. [x] Nothing by mouth after midnight; including gum, candy, mints, or water. [x] You may brush your teeth, gargle, but do NOT swallow water. [x] No tobacco products, illegal drugs, marijuana or alcohol within 24 hours of your surgery. [x] Jewelry or valuables should not be brought to the hospital. All body and/or tongue piercing's must be removed prior to arriving to hospital. No contact lens or hair pins. [x] Arrange transportation with a responsible adult  to and from the hospital. Arrange for someone to be with you for the remainder of the day and for 24 hours after your procedure due to having had anesthesia. -Who will be your  for transportation? Luiz Riedel, friend        -Who will be staying with you for 24 hrs after your procedure? Melani Arteaga, daughter  [x] Bring insurance card and photo ID. [x] Urine Pregnancy test will be preformed the day of surgery. A specimen sample may be brought from home. PARKING INSTRUCTIONS:     [x] ARRIVAL DATE  3/24 & TIME   11:15 am:   [x] Enter into the [employer]. Two people may accompany you. [x] Parking lot '\"I\"  is located on [facility] [place] (the corner of [facility]). To enter the lot,you will need to get a parking ticket at the gate . To exit you will be given a free parking voucher. You will need both the ticket and parking voucher to exit the parking lot. One car per patient is allowed to park in this lot. Walk up the front walk to the [facility], the door will be locked an employee will greet you and let you in. EDUCATION INSTRUCTIONS:             [x] Milagros 75 placed in chart.      [x] Pain: Post-op pain is normal and to be expected. You will be asked to rate your pain from 0-10. MEDICATION INSTRUCTIONS:    [x] Bring a complete list of your medications, please write the last time you took the medicine, give this list to the nurse in Pre-Op. [x] Take only the following medications the morning of surgery with 1-2 ounces of water:  bupropion        [x] Stop all herbal supplements and vitamins 5 days before surgery. Stop ibuprofen (NSAIDS) 7 days before surgery. [x] DO NOT take any diabetic medicine the morning of surgery. Follow instructions for insulin the day before surgery. States is diet controled  [x] If you are diabetic and your blood sugar is low or you feel symptomatic, you may drink 1-2 ounces of apple juice only or take a glucose tablet.            -The morning of your procedure, you may call the pre-op area if you have concerns about your blood sugar [phone]. [x] Follow physician instructions regarding any blood thinners you may be taking. WHAT TO EXPECT:    [x] The day of surgery you will be greeted and checked in by the [employer].  In addition, you will be registered in the [employer] by a Patient Access Representative. Please bring your photo ID and insurance card. A nurse will greet you in accordance to the time you are needed in the pre-op area to prepare you for surgery. Please do not be discouraged if you are not greeted in the order you arrive as there are many variables that are involved in patient preparation. Your patience is greatly appreciated as you wait for your nurse. Please bring in items such as: books, magazines, newspapers, electronics, or any other items  to occupy your time in the waiting area. [x]  Delays may occur with surgery and staff will make a sincere effort to keep you informed of delays.   If any delays occur with your procedure, we apologize ahead of time for your inconvenience as we recognize the value of your

## 2023-03-24 ENCOUNTER — ANESTHESIA (OUTPATIENT)
Dept: ENDOSCOPY | Age: 43
End: 2023-03-24
Payer: COMMERCIAL

## 2023-03-24 ENCOUNTER — HOSPITAL ENCOUNTER (OUTPATIENT)
Age: 43
Setting detail: OUTPATIENT SURGERY
Discharge: HOME | End: 2023-03-24
Attending: SURGERY | Admitting: SURGERY
Payer: COMMERCIAL

## 2023-03-24 ENCOUNTER — ANESTHESIA EVENT (OUTPATIENT)
Dept: ENDOSCOPY | Age: 43
End: 2023-03-24
Payer: COMMERCIAL

## 2023-03-24 VITALS
HEIGHT: 59 IN | BODY MASS INDEX: 28.63 KG/M2 | WEIGHT: 142 LBS | DIASTOLIC BLOOD PRESSURE: 73 MMHG | SYSTOLIC BLOOD PRESSURE: 117 MMHG | RESPIRATION RATE: 18 BRPM | HEART RATE: 76 BPM | OXYGEN SATURATION: 96 % | TEMPERATURE: 97.6 F

## 2023-03-24 DIAGNOSIS — Z01.812 PRE-OPERATIVE LABORATORY EXAMINATION: Primary | ICD-10-CM

## 2023-03-24 DIAGNOSIS — K30 INDIGESTION: ICD-10-CM

## 2023-03-24 DIAGNOSIS — R12 HEARTBURN: ICD-10-CM

## 2023-03-24 LAB
HCG, URINE, POC: NEGATIVE
Lab: NORMAL
METER GLUCOSE: 92 MG/DL (ref 74–99)
NEGATIVE QC PASS/FAIL: NORMAL
POSITIVE QC PASS/FAIL: NORMAL

## 2023-03-24 PROCEDURE — 82962 GLUCOSE BLOOD TEST: CPT

## 2023-03-24 PROCEDURE — 88305 TISSUE EXAM BY PATHOLOGIST: CPT

## 2023-03-24 PROCEDURE — 43239 EGD BIOPSY SINGLE/MULTIPLE: CPT | Performed by: SURGERY

## 2023-03-24 PROCEDURE — 6360000002 HC RX W HCPCS: Performed by: NURSE ANESTHETIST, CERTIFIED REGISTERED

## 2023-03-24 PROCEDURE — 2709999900 HC NON-CHARGEABLE SUPPLY: Performed by: SURGERY

## 2023-03-24 PROCEDURE — 3700000001 HC ADD 15 MINUTES (ANESTHESIA): Performed by: SURGERY

## 2023-03-24 PROCEDURE — 3609012400 HC EGD TRANSORAL BIOPSY SINGLE/MULTIPLE: Performed by: SURGERY

## 2023-03-24 PROCEDURE — 7100000011 HC PHASE II RECOVERY - ADDTL 15 MIN: Performed by: SURGERY

## 2023-03-24 PROCEDURE — 88342 IMHCHEM/IMCYTCHM 1ST ANTB: CPT

## 2023-03-24 PROCEDURE — 3700000000 HC ANESTHESIA ATTENDED CARE: Performed by: SURGERY

## 2023-03-24 PROCEDURE — 2580000003 HC RX 258: Performed by: SURGERY

## 2023-03-24 PROCEDURE — 7100000010 HC PHASE II RECOVERY - FIRST 15 MIN: Performed by: SURGERY

## 2023-03-24 RX ORDER — SODIUM CHLORIDE 0.9 % (FLUSH) 0.9 %
5-40 SYRINGE (ML) INJECTION PRN
Status: DISCONTINUED | OUTPATIENT
Start: 2023-03-24 | End: 2023-03-24 | Stop reason: HOSPADM

## 2023-03-24 RX ORDER — SODIUM CHLORIDE 9 MG/ML
INJECTION, SOLUTION INTRAVENOUS CONTINUOUS
Status: DISCONTINUED | OUTPATIENT
Start: 2023-03-24 | End: 2023-03-24 | Stop reason: HOSPADM

## 2023-03-24 RX ORDER — OMEPRAZOLE 20 MG/1
20 TABLET, DELAYED RELEASE ORAL DAILY
Qty: 30 TABLET | Refills: 3 | Status: SHIPPED | OUTPATIENT
Start: 2023-03-24

## 2023-03-24 RX ORDER — SODIUM CHLORIDE 9 MG/ML
25 INJECTION, SOLUTION INTRAVENOUS PRN
Status: DISCONTINUED | OUTPATIENT
Start: 2023-03-24 | End: 2023-03-24 | Stop reason: HOSPADM

## 2023-03-24 RX ORDER — SODIUM CHLORIDE 0.9 % (FLUSH) 0.9 %
5-40 SYRINGE (ML) INJECTION EVERY 12 HOURS SCHEDULED
Status: DISCONTINUED | OUTPATIENT
Start: 2023-03-24 | End: 2023-03-24 | Stop reason: HOSPADM

## 2023-03-24 RX ORDER — PROPOFOL 10 MG/ML
INJECTION, EMULSION INTRAVENOUS PRN
Status: DISCONTINUED | OUTPATIENT
Start: 2023-03-24 | End: 2023-03-24 | Stop reason: SDUPTHER

## 2023-03-24 RX ADMIN — PROPOFOL 200 MG: 10 INJECTION, EMULSION INTRAVENOUS at 12:36

## 2023-03-24 RX ADMIN — SODIUM CHLORIDE: 9 INJECTION, SOLUTION INTRAVENOUS at 12:22

## 2023-03-24 ASSESSMENT — COPD QUESTIONNAIRES: CAT_SEVERITY: MILD

## 2023-03-24 ASSESSMENT — LIFESTYLE VARIABLES: SMOKING_STATUS: 1

## 2023-03-24 ASSESSMENT — PAIN SCALES - GENERAL: PAINLEVEL_OUTOF10: 0

## 2023-03-24 NOTE — PROGRESS NOTES
Patient given discharge instructions & verbalized understanding. Family present. Dr. Delta Young spoke with patient & family.

## 2023-03-24 NOTE — H&P
CC:  abd pain     HPI:  pt states she has a bulge that goes from her umbilicus up to top of stomach. Pt states she has regurgitation of food after eating. Pt reports increased heartburn and belching. Pt states no association with particular foods--happens with all of the foods. Pt states she has nausea and upset stomach all of the time. Reports she was given pantoprazole but even that did not help with her symptoms. Reports occasional constipation. Denies blood in stool or black stools. Past Medical History        Past Medical History:   Diagnosis Date    Anxiety      Asthma      Bipolar disorder ([facility]      Depression      Diabetes mellitus ([facility]      Neurofibromatosis ([facility]      Obesity      Sleep apnea              Past Surgical History         Past Surgical History:   Procedure Laterality Date     SECTION        FOOT SURGERY        TUBAL LIGATION        WISDOM TOOTH EXTRACTION                Current Facility-Administered Medications          Current Outpatient Medications   Medication Sig Dispense Refill    ARISTADA 1064 MG/3.9ML PRSY injection          nicotine (NICODERM CQ) 14 MG/24HR APPLY 1 PATCH TO SKIN ONCE DAILY FOR 14 DAYS 30 patch 10    buPROPion (WELLBUTRIN) 100 MG tablet Take 100 mg by mouth daily        ARIPiprazole ER (ABILIFY MAINTENA) 400 MG SRER Inject 400 mg into the muscle every 30 days        benztropine mesylate (COGENTIN) 1 MG/ML injection Inject 1 mg into the muscle every 30 days        LORATADINE-D 12HR 5-120 MG per extended release tablet TAKE ONE (1) TABLET BY MOUTH TWICE DAILY 60 tablet 5      No current facility-administered medications for this visit.                  Allergies   Allergen Reactions    Flagyl [Metronidazole]      Tetracyclines & Related           Family History         Family History   Problem Relation Age of Onset    Breast Cancer Mother      Diabetes Mother      Hypotension Mother      Dementia Mother      No Known Problems Father they may require biopsy or polypectomy. These procedures may increase the risk of complication. All questions were answered. The patient verbalized understanding and agreed to proceed.      Yesica Chiu MD, FACS  3/20/2023  2:01 PM      UPDATE 3/24/23  History and physical unchanged  For EGD    Yesica Chiu MD, FACS  3/24/2023  11:47 AM

## 2023-03-24 NOTE — DISCHARGE INSTRUCTIONS
at once if you have:  severe stomach pain, diarrhea that is watery or bloody;  new or unusual pain in your wrist, thigh, hip, or back;  seizure (convulsions);  kidney problems --fever, rash, nausea, loss of appetite, joint pain, urinating less than usual, blood in your urine, weight gain;  low magnesium --dizziness, irregular heartbeats, feeling jittery, muscle cramps, muscle spasms, cough or choking feeling; or  new or worsening symptoms of lupus --joint pain, and a skin rash on your cheeks or arms that worsens in sunlight. Taking omeprazole long-term may cause you to develop stomach growths called fundic gland polyps. Talk with your doctor about this risk. If you use omeprazole for longer than 3 years, you could develop a vitamin B-12 deficiency. Talk to your doctor about how to manage this condition if you develop it. Common side effects may include:  cold symptoms such as stuffy nose, sneezing, sore throat (especially in children);  fever (especially in children);  stomach pain, gas;  nausea, vomiting, diarrhea; or  headache. This is not a complete list of side effects and others may occur. Call your doctor for medical advice about side effects. You may report side effects to FDA at 6-587-FDA-3424. What other drugs will affect omeprazole? Sometimes it is not safe to use certain medications at the same time. Some drugs can affect your blood levels of other drugs you take, which may increase side effects or make the medications less effective. Tell your doctor about all your current medicines. Many drugs can affect omeprazole, especially:  digoxin;  clopidogrel;  methotrexate;  Elbing's wort;  a diuretic or \"water pill\"; or  an antibiotic --amoxicillin, clarithromycin, rifampin. This list is not complete and many other drugs may affect omeprazole. This includes prescription and over-the-counter medicines, vitamins, and herbal products. Not all possible drug interactions are listed here.   Where can I get your healthcare professional. [address] [address] any warranty or liability for your use of this information.

## 2023-03-24 NOTE — ANESTHESIA PRE PROCEDURE
ALKPHOS 70 04/28/2011 07:20 PM    AST 17 04/28/2011 07:20 PM    ALT 16 04/28/2011 07:20 PM       POC Tests: No results for input(s): POCGLU, POCNA, POCK, POCCL, POCBUN, POCHEMO, POCHCT in the last 72 hours. Coags: No results found for: PROTIME, INR, APTT    HCG (If Applicable):   Lab Results   Component Value Date    PREGTESTUR NEGATIVE 07/15/2018    PREGSERUM NEGATIVE 10/22/2010        ABGs: No results found for: PHART, PO2ART, ZGE6GQI, AQR5AIC, BEART, C8RQGNLK     Type & Screen (If Applicable):  No results found for: LABABO, LABRH    Drug/Infectious Status (If Applicable):  No results found for: HIV, HEPCAB    COVID-19 Screening (If Applicable): No results found for: COVID19        Anesthesia Evaluation    Airway: Mallampati: III  TM distance: >3 FB   Neck ROM: full  Mouth opening: > = 3 FB   Dental:    (+) poor dentition      Pulmonary:normal exam    (+) COPD: mild,  sleep apnea: on noncompliant,  asthma: current smoker          Patient smoked on day of surgery. Cardiovascular:            Rhythm: regular  Rate: normal                    Neuro/Psych:   (+) psychiatric history:            GI/Hepatic/Renal:   (+) GERD:,           Endo/Other:    (+) DiabetesType II DM, , .                 Abdominal:             Vascular: Other Findings:           Anesthesia Plan      MAC     ASA 3       Induction: intravenous. Anesthetic plan and risks discussed with patient. Plan discussed with attending.                 NPO>MN    JANAE Goodson - CRNA   3/24/2023

## 2023-03-24 NOTE — PROGRESS NOTES
Patient admitted to [facility] & placed on appropriate monitors. Cart low, locked with siderails up. Call light within reach.

## 2023-03-24 NOTE — ANESTHESIA POSTPROCEDURE EVALUATION
Department of Anesthesiology  Postprocedure Note    Patient: Zo Whitt  MRN: [id number]  YOB: 1980  Date of evaluation: 3/24/2023      Procedure Summary     Date: 03/24/23 Room / Location: [address] [place] [address] / [facility]    Anesthesia Start: 5771 Anesthesia Stop: 6407    Procedure: EGD BIOPSY Diagnosis:       Heartburn      Indigestion      (Heartburn [R12])      (Indigestion [K30])    Surgeons: Jeanine Deluca MD Responsible Provider: Anne Handley MD    Anesthesia Type: MAC ASA Status: 3          Anesthesia Type: No value filed.     Graeme Phase I: Graeme Score: 10    Graeme Phase II:        Anesthesia Post Evaluation    Patient location during evaluation: PACU  Patient participation: complete - patient participated  Level of consciousness: awake  Airway patency: patent  Nausea & Vomiting: no nausea and no vomiting  Complications: no  Cardiovascular status: hemodynamically stable  Respiratory status: acceptable  Hydration status: euvolemic

## 2023-03-24 NOTE — OP NOTE
Operative Note      Patient: Mack Perea  YOB: 1980  MRN: [id number]    Date of Procedure: 3/24/2023    Pre-Op Diagnosis: Heartburn [R12]  Indigestion [K30]    Post-Op Diagnosis: Same and gastritis       Procedure(s):  EGD BIOPSY    Surgeon(s):  Jeff Cano MD    Assistant:   * No surgical staff found *    Anesthesia: Monitor Anesthesia Care    Estimated Blood Loss (mL): Minimal    Complications: None    Specimens:   ID Type Source Tests Collected by Time Destination   A : EGD STOMACH ANTRUM BIOPSY R/O H PYLORI Tissue Stomach SURGICAL PATHOLOGY Jeff Cano MD 3/24/2023 1239        Implants:  * No implants in log *      Drains: * No LDAs found *    Findings: gastritis    Detailed Description of Procedure:   ESOPHAGOGASTRODUODENOSCOPY PROCEDURE NOTE    PROCEDURE:  The patient was brought into the endoscopy suite and placed in the left lateral decubitus position. A bite block was placed in the patients mouth. After the initiation of LMAC anesthesia, a gastroscope was inserted into the patient's mouth and passed into the esophagus and into the stomach. Immediately upon entering the stomach the note of gastritis was made. The scope was advanced through the pylorus into the first and second portion of the duodenum making the note of normal duodenum. The scope was then withdrawn back into the stomach where it was retroflexed. There was no hiatal hernia noted. The scope was then straightened and antral biopsies were taken. The scope was then withdrawn the entire length of the esophagus, making the note of a normal esophagus without masses, ulcerations, or lesions noted. The scope was withdrawn entirely. The patient tolerated the procedure well and there were no complications.             Electronically signed by Jeff Cano MD on 3/24/2023 at 1:05 PM

## 2023-08-02 ENCOUNTER — APPOINTMENT (OUTPATIENT)
Dept: GENERAL RADIOLOGY | Age: 43
End: 2023-08-02
Payer: COMMERCIAL

## 2023-08-02 ENCOUNTER — HOSPITAL ENCOUNTER (EMERGENCY)
Age: 43
Discharge: HOME | End: 2023-08-02
Payer: COMMERCIAL

## 2023-08-02 VITALS
OXYGEN SATURATION: 98 % | SYSTOLIC BLOOD PRESSURE: 133 MMHG | DIASTOLIC BLOOD PRESSURE: 84 MMHG | RESPIRATION RATE: 18 BRPM | TEMPERATURE: 98 F | BODY MASS INDEX: 29.89 KG/M2 | WEIGHT: 148 LBS | HEART RATE: 98 BPM

## 2023-08-02 DIAGNOSIS — M25.461 KNEE EFFUSION, RIGHT: Primary | ICD-10-CM

## 2023-08-02 PROCEDURE — 99283 EMERGENCY DEPT VISIT LOW MDM: CPT

## 2023-08-02 PROCEDURE — 73562 X-RAY EXAM OF KNEE 3: CPT

## 2023-08-02 RX ORDER — NAPROXEN 500 MG/1
500 TABLET ORAL 2 TIMES DAILY WITH MEALS
Qty: 20 TABLET | Refills: 0 | Status: SHIPPED | OUTPATIENT
Start: 2023-08-02

## 2023-08-02 ASSESSMENT — PAIN DESCRIPTION - LOCATION: LOCATION: KNEE

## 2023-08-02 ASSESSMENT — PAIN DESCRIPTION - ONSET: ONSET: ON-GOING

## 2023-08-02 ASSESSMENT — PAIN SCALES - GENERAL: PAINLEVEL_OUTOF10: 7

## 2023-08-02 ASSESSMENT — PAIN DESCRIPTION - PAIN TYPE: TYPE: ACUTE PAIN

## 2023-08-02 ASSESSMENT — PAIN - FUNCTIONAL ASSESSMENT: PAIN_FUNCTIONAL_ASSESSMENT: 0-10

## 2023-08-02 ASSESSMENT — PAIN DESCRIPTION - ORIENTATION: ORIENTATION: RIGHT

## 2023-08-02 ASSESSMENT — LIFESTYLE VARIABLES
HOW MANY STANDARD DRINKS CONTAINING ALCOHOL DO YOU HAVE ON A TYPICAL DAY: PATIENT DOES NOT DRINK
HOW OFTEN DO YOU HAVE A DRINK CONTAINING ALCOHOL: NEVER

## 2023-08-02 ASSESSMENT — PAIN DESCRIPTION - FREQUENCY: FREQUENCY: CONTINUOUS

## 2023-08-02 ASSESSMENT — PAIN DESCRIPTION - DESCRIPTORS: DESCRIPTORS: SHARP;SHOOTING

## 2024-02-08 ENCOUNTER — HOSPITAL ENCOUNTER (EMERGENCY)
Age: 44
Discharge: HOME | End: 2024-02-08
Attending: EMERGENCY MEDICINE
Payer: COMMERCIAL

## 2024-02-08 ENCOUNTER — APPOINTMENT (OUTPATIENT)
Dept: CT IMAGING | Age: 44
End: 2024-02-08
Attending: EMERGENCY MEDICINE
Payer: COMMERCIAL

## 2024-02-08 VITALS
SYSTOLIC BLOOD PRESSURE: 130 MMHG | TEMPERATURE: 97.8 F | WEIGHT: 120 LBS | OXYGEN SATURATION: 96 % | HEART RATE: 92 BPM | DIASTOLIC BLOOD PRESSURE: 98 MMHG | RESPIRATION RATE: 20 BRPM | BODY MASS INDEX: 24.24 KG/M2

## 2024-02-08 DIAGNOSIS — K62.5 RECTAL BLEEDING: ICD-10-CM

## 2024-02-08 DIAGNOSIS — R10.84 GENERALIZED ABDOMINAL PAIN: Primary | ICD-10-CM

## 2024-02-08 LAB
ALBUMIN SERPL-MCNC: 4.1 G/DL (ref 3.5–5.2)
ALP SERPL-CCNC: 101 U/L (ref 35–104)
ALT SERPL-CCNC: 15 U/L (ref 0–32)
ANION GAP SERPL CALCULATED.3IONS-SCNC: 12 MMOL/L (ref 7–16)
AST SERPL-CCNC: 17 U/L (ref 0–31)
BASOPHILS # BLD: 0.12 K/UL (ref 0–0.2)
BASOPHILS NFR BLD: 1 % (ref 0–2)
BILIRUB SERPL-MCNC: <0.2 MG/DL (ref 0–1.2)
BUN SERPL-MCNC: 12 MG/DL (ref 6–20)
CALCIUM SERPL-MCNC: 9.2 MG/DL (ref 8.6–10.2)
CHLORIDE SERPL-SCNC: 106 MMOL/L (ref 98–107)
CO2 SERPL-SCNC: 22 MMOL/L (ref 22–29)
CREAT SERPL-MCNC: 0.7 MG/DL (ref 0.5–1)
EKG ATRIAL RATE: 81 BPM
EKG P AXIS: 62 DEGREES
EKG P-R INTERVAL: 172 MS
EKG Q-T INTERVAL: 396 MS
EKG QRS DURATION: 80 MS
EKG QTC CALCULATION (BAZETT): 460 MS
EKG R AXIS: 25 DEGREES
EKG T AXIS: 58 DEGREES
EKG VENTRICULAR RATE: 81 BPM
EOSINOPHIL # BLD: 0.08 K/UL (ref 0.05–0.5)
EOSINOPHILS RELATIVE PERCENT: 1 % (ref 0–6)
ERYTHROCYTE [DISTWIDTH] IN BLOOD BY AUTOMATED COUNT: 14 % (ref 11.5–15)
GFR SERPL CREATININE-BSD FRML MDRD: >60 ML/MIN/1.73M2
GLUCOSE BLD-MCNC: 107 MG/DL (ref 74–99)
GLUCOSE SERPL-MCNC: 63 MG/DL (ref 74–99)
HCG, URINE, POC: NEGATIVE
HCT VFR BLD AUTO: 43.6 % (ref 34–48)
HGB BLD-MCNC: 14.4 G/DL (ref 11.5–15.5)
IMM GRANULOCYTES # BLD AUTO: 0.07 K/UL (ref 0–0.58)
IMM GRANULOCYTES NFR BLD: 1 % (ref 0–5)
LACTATE BLDV-SCNC: 1.8 MMOL/L (ref 0.5–2.2)
LIPASE SERPL-CCNC: 29 U/L (ref 13–60)
LYMPHOCYTES NFR BLD: 3.23 K/UL (ref 1.5–4)
LYMPHOCYTES RELATIVE PERCENT: 28 % (ref 20–42)
Lab: NORMAL
MCH RBC QN AUTO: 27.2 PG (ref 26–35)
MCHC RBC AUTO-ENTMCNC: 33 G/DL (ref 32–34.5)
MCV RBC AUTO: 82.4 FL (ref 80–99.9)
MONOCYTES NFR BLD: 1.29 K/UL (ref 0.1–0.95)
MONOCYTES NFR BLD: 11 % (ref 2–12)
NEGATIVE QC PASS/FAIL: NORMAL
NEUTROPHILS NFR BLD: 59 % (ref 43–80)
NEUTS SEG NFR BLD: 6.97 K/UL (ref 1.8–7.3)
PLATELET # BLD AUTO: 515 K/UL (ref 130–450)
PMV BLD AUTO: 10.3 FL (ref 7–12)
POSITIVE QC PASS/FAIL: NORMAL
POTASSIUM SERPL-SCNC: 4.6 MMOL/L (ref 3.5–5)
PROT SERPL-MCNC: 7.6 G/DL (ref 6.4–8.3)
RBC # BLD AUTO: 5.29 M/UL (ref 3.5–5.5)
SODIUM SERPL-SCNC: 140 MMOL/L (ref 132–146)
TROPONIN I SERPL HS-MCNC: <6 NG/L (ref 0–9)
WBC OTHER # BLD: 11.8 K/UL (ref 4.5–11.5)

## 2024-02-08 PROCEDURE — 93010 ELECTROCARDIOGRAM REPORT: CPT | Performed by: INTERNAL MEDICINE

## 2024-02-08 PROCEDURE — 99285 EMERGENCY DEPT VISIT HI MDM: CPT

## 2024-02-08 PROCEDURE — 93005 ELECTROCARDIOGRAM TRACING: CPT | Performed by: EMERGENCY MEDICINE

## 2024-02-08 PROCEDURE — 6360000002 HC RX W HCPCS: Performed by: EMERGENCY MEDICINE

## 2024-02-08 PROCEDURE — 6370000000 HC RX 637 (ALT 250 FOR IP): Performed by: EMERGENCY MEDICINE

## 2024-02-08 PROCEDURE — 83690 ASSAY OF LIPASE: CPT

## 2024-02-08 PROCEDURE — 6360000004 HC RX CONTRAST MEDICATION: Performed by: RADIOLOGY

## 2024-02-08 PROCEDURE — 2580000003 HC RX 258: Performed by: EMERGENCY MEDICINE

## 2024-02-08 PROCEDURE — C9113 INJ PANTOPRAZOLE SODIUM, VIA: HCPCS | Performed by: EMERGENCY MEDICINE

## 2024-02-08 PROCEDURE — 96374 THER/PROPH/DIAG INJ IV PUSH: CPT

## 2024-02-08 PROCEDURE — 85025 COMPLETE CBC W/AUTO DIFF WBC: CPT

## 2024-02-08 PROCEDURE — 74177 CT ABD & PELVIS W/CONTRAST: CPT

## 2024-02-08 PROCEDURE — 82962 GLUCOSE BLOOD TEST: CPT

## 2024-02-08 PROCEDURE — 84484 ASSAY OF TROPONIN QUANT: CPT

## 2024-02-08 PROCEDURE — 96375 TX/PRO/DX INJ NEW DRUG ADDON: CPT

## 2024-02-08 PROCEDURE — 83605 ASSAY OF LACTIC ACID: CPT

## 2024-02-08 PROCEDURE — 80053 COMPREHEN METABOLIC PANEL: CPT

## 2024-02-08 RX ORDER — PANTOPRAZOLE SODIUM 40 MG/10ML
80 INJECTION, POWDER, LYOPHILIZED, FOR SOLUTION INTRAVENOUS ONCE
Status: COMPLETED | OUTPATIENT
Start: 2024-02-08 | End: 2024-02-08

## 2024-02-08 RX ORDER — SUCRALFATE 1 G/1
1 TABLET ORAL EVERY 6 HOURS
Qty: 20 TABLET | Refills: 0 | Status: SHIPPED | OUTPATIENT
Start: 2024-02-08

## 2024-02-08 RX ORDER — PANTOPRAZOLE SODIUM 40 MG/1
40 TABLET, DELAYED RELEASE ORAL
Qty: 14 TABLET | Refills: 0 | Status: SHIPPED | OUTPATIENT
Start: 2024-02-08 | End: 2024-02-22

## 2024-02-08 RX ORDER — DEXTROSE MONOHYDRATE 25 G/50ML
25 INJECTION, SOLUTION INTRAVENOUS ONCE
Status: DISCONTINUED | OUTPATIENT
Start: 2024-02-08 | End: 2024-02-08

## 2024-02-08 RX ORDER — ONDANSETRON 2 MG/ML
4 INJECTION INTRAMUSCULAR; INTRAVENOUS ONCE
Status: COMPLETED | OUTPATIENT
Start: 2024-02-08 | End: 2024-02-08

## 2024-02-08 RX ORDER — ONDANSETRON 4 MG/1
4 TABLET, ORALLY DISINTEGRATING ORAL 3 TIMES DAILY PRN
Qty: 14 TABLET | Refills: 0 | Status: SHIPPED | OUTPATIENT
Start: 2024-02-08

## 2024-02-08 RX ORDER — 0.9 % SODIUM CHLORIDE 0.9 %
1000 INTRAVENOUS SOLUTION INTRAVENOUS ONCE
Status: COMPLETED | OUTPATIENT
Start: 2024-02-08 | End: 2024-02-08

## 2024-02-08 RX ORDER — FLUOXETINE HYDROCHLORIDE 20 MG/1
20 CAPSULE ORAL DAILY
COMMUNITY

## 2024-02-08 RX ORDER — ACETAMINOPHEN 500 MG
1000 TABLET ORAL ONCE
Status: COMPLETED | OUTPATIENT
Start: 2024-02-08 | End: 2024-02-08

## 2024-02-08 RX ADMIN — ACETAMINOPHEN 1000 MG: 500 TABLET ORAL at 14:31

## 2024-02-08 RX ADMIN — ONDANSETRON 4 MG: 2 INJECTION INTRAMUSCULAR; INTRAVENOUS at 14:30

## 2024-02-08 RX ADMIN — SODIUM CHLORIDE 1000 ML: 9 INJECTION, SOLUTION INTRAVENOUS at 14:29

## 2024-02-08 RX ADMIN — IOPAMIDOL 75 ML: 755 INJECTION, SOLUTION INTRAVENOUS at 16:20

## 2024-02-08 RX ADMIN — PANTOPRAZOLE SODIUM 80 MG: 40 INJECTION, POWDER, FOR SOLUTION INTRAVENOUS at 14:30

## 2024-02-08 NOTE — ED PROVIDER NOTES
interpreted by me, Dr. Shane MD    Rate: 81  Rhythm: Sinus  Interpretation: Normal sinus rhythm, normal MS interval, normal QRS, normal axis, normal QT interval, no acute ST or T wave changes  Comparison: stable as compared to patient's most recent EKG   [JA]   1935 Patient able to tolerate p.o.    Patient in the ED for nearly 7 hours with no episodes of rectal bleeding.    States that she has an appointment with Dr. Uribe on Monday. [JA]      ED Course User Index  [JA] Kirsty Lynn MD       Patient remained hemodynamically stable throughout ED course.     Discharge Medication List as of 2/8/2024  7:54 PM        START taking these medications    Details   pantoprazole (PROTONIX) 40 MG tablet Take 1 tablet by mouth daily (with breakfast) for 14 days, Disp-14 tablet, R-0Normal      ondansetron (ZOFRAN-ODT) 4 MG disintegrating tablet Take 1 tablet by mouth 3 times daily as needed for Nausea or Vomiting, Disp-14 tablet, R-0Normal             Counseling:   The emergency provider has spoken with the patient and discussed today’s results, in addition to providing specific details for the plan of care and counseling regarding the diagnosis and prognosis.  Questions are answered at this time and they are agreeable with the plan.      --------------------------------- IMPRESSION AND DISPOSITION ---------------------------------    IMPRESSION  1. Generalized abdominal pain    2. Rectal bleeding        DISPOSITION  Disposition: Discharge to home  Patient condition is stable      NOTE: This report was transcribed using voice recognition software. Every effort was made to ensure accuracy; however, inadvertent computerized transcription errors may be present    Kirsty JUAREZ MD, am the primary provider of this record       Kirsty Lynn MD  02/08/24 6332

## 2024-02-08 NOTE — ED NOTES
Department of Emergency Medicine  FIRST PROVIDER TRIAGE NOTE             Independent MLP           2/8/24  12:57 PM EST    Date of Encounter: 2/8/24   MRN: [id number]      HPI: Hannah Burnett is a 43 y.o. female who presents to the ED for Abdominal Pain (Abd pains for the past couple weeks with nausea and vomiting. States attempted to eat and today had a black and bloody stool )       ROS: Negative for cp or sob.    PE: Gen Appearance/Constitutional: alert  HEENT: NC/NT. PERRLA,  Airway patent.     Initial Plan of Care: All treatment areas with department are currently occupied. Plan to order/Initiate the following while awaiting opening in ED: none.  Initiate Treatment-Testing, Proceed toTreatment Area When Bed Available for ED Attending/MLP to Continue Care    Electronically signed by Roshni Garibay PA-C   DD: 2/8/24      Roshni Garibay PA-C  02/08/24 1257

## 2024-02-12 ENCOUNTER — OFFICE VISIT (OUTPATIENT)
Dept: SURGERY | Age: 44
End: 2024-02-12
Payer: COMMERCIAL

## 2024-02-12 ENCOUNTER — TELEPHONE (OUTPATIENT)
Dept: SURGERY | Age: 44
End: 2024-02-12

## 2024-02-12 ENCOUNTER — PREP FOR PROCEDURE (OUTPATIENT)
Dept: SURGERY | Age: 44
End: 2024-02-12

## 2024-02-12 VITALS
HEIGHT: 59 IN | WEIGHT: 143 LBS | DIASTOLIC BLOOD PRESSURE: 73 MMHG | BODY MASS INDEX: 28.83 KG/M2 | SYSTOLIC BLOOD PRESSURE: 115 MMHG | RESPIRATION RATE: 16 BRPM | HEART RATE: 78 BPM

## 2024-02-12 DIAGNOSIS — K92.1 BLOOD IN STOOL: ICD-10-CM

## 2024-02-12 DIAGNOSIS — R10.84 ABDOMINAL PAIN, GENERALIZED: ICD-10-CM

## 2024-02-12 DIAGNOSIS — R11.2 NAUSEA AND VOMITING, UNSPECIFIED VOMITING TYPE: ICD-10-CM

## 2024-02-12 DIAGNOSIS — R10.84 GENERALIZED ABDOMINAL PAIN: Primary | ICD-10-CM

## 2024-02-12 PROCEDURE — G8427 DOCREV CUR MEDS BY ELIG CLIN: HCPCS | Performed by: SURGERY

## 2024-02-12 PROCEDURE — G8419 CALC BMI OUT NRM PARAM NOF/U: HCPCS | Performed by: SURGERY

## 2024-02-12 PROCEDURE — 4004F PT TOBACCO SCREEN RCVD TLK: CPT | Performed by: SURGERY

## 2024-02-12 PROCEDURE — 99212 OFFICE O/P EST SF 10 MIN: CPT

## 2024-02-12 PROCEDURE — 99214 OFFICE O/P EST MOD 30 MIN: CPT | Performed by: SURGERY

## 2024-02-12 PROCEDURE — G8484 FLU IMMUNIZE NO ADMIN: HCPCS | Performed by: SURGERY

## 2024-02-12 RX ORDER — SODIUM PICOSULFATE, MAGNESIUM OXIDE, AND ANHYDROUS CITRIC ACID 12; 3.5; 1 G/175ML; G/175ML; MG/175ML
175 LIQUID ORAL ONCE
Qty: 2 EACH | Refills: 0 | Status: SHIPPED | OUTPATIENT
Start: 2024-02-12 | End: 2024-02-12

## 2024-02-12 NOTE — PROGRESS NOTES
[facility]  History and Physical    Patient's Name/Date of Birth: Hannah GONZALES Lex / 1980 (43 y.o.)    PCP:  Dr. Vazquez    Chief Complaint:  abd pain    History of Present Illness:  43 yr old female here with complaint of ongoing abd pain.  Previously underwent EGD 3/2023--found to have chronic gastritis.  Pt was seen in ED last week due to ongoing abd pain.  States pain is in her mid abdomen.  Reports she was also having black stools with some blood clots in her stool.  Pt has been on PPI/carafate with no relief.  States she took one of her friends \"Crohn's pills and it kind of made me feel better.\"  Complains of nausea and vomiting after eating.      Past Medical History:   Diagnosis Date    Anxiety     Asthma     Bipolar disorder (HCC)     Depression     Diabetes mellitus (HCC)     Neurofibromatosis (HCC)     Obesity     Sleep apnea        Past Surgical History:   Procedure Laterality Date     SECTION      ESOPHAGOGASTRODUODENOSCOPY  2023    minimal gastritis--ravi    FOOT SURGERY      TUBAL LIGATION      UPPER GASTROINTESTINAL ENDOSCOPY N/A 3/24/2023    EGD BIOPSY performed by Henry Uribe MD at [facility] ENDOSCOPY    WISDOM TOOTH EXTRACTION         Family History   Problem Relation Age of Onset    Breast Cancer Mother     Diabetes Mother     Hypotension Mother     Dementia Mother     No Known Problems Father        Social History     Socioeconomic History    Marital status: Single     Spouse name: Not on file    Number of children: Not on file    Years of education: Not on file    Highest education level: Not on file   Occupational History    Not on file   Tobacco Use    Smoking status: Every Day     Current packs/day: 1.00     Types: Cigarettes    Smokeless tobacco: Never   Vaping Use    Vaping Use: Not on file   Substance and Sexual Activity    Alcohol use: Not Currently     Alcohol/week: 7.0 standard drinks of alcohol     Types: 7 Shots of liquor per week    Drug use: Not

## 2024-02-12 NOTE — PATIENT INSTRUCTIONS
Call [phone] for any questions/concerns.    Patient Information and Instructions for  Upper GI Endoscopy or Esophagogastroduodenoscopy [EGD])         Definition Upper GI Endoscopy or Esophagogastroduodenoscopy [EGD])  This is a test that uses a fiberoptic scope to examine the esophagus (throat), stomach, and upper part of the small intestines.     Upper GI endoscopy may be recommended if you have:   Abdominal pain   Severe heartburn   Persistent nausea and vomiting   Difficulty swallowing   Blood in stool or vomit   Abnormal x-ray or other examinations of the gastrointestinal tract     Conditions that can be diagnosed with upper GI endoscopy include:   Ulcers   Tumors   Polyps   Abnormal narrowing   Inflammation     Possible Complications   Complications are rare, but no procedure is completely free of risk. If you are planning to have upper GI endoscopy, your doctor will review a list of possible complications, which may include:   Bleeding   Damage to the esophagus, stomach, or intestine   Infection   Respiratory depression (reduced breathing rate and/or depth)   Reaction to sedatives or anesthesia causing your blood pressure to drop    Some factors that may increase the risk of complications include:   Age: 60 or older   Pregnancy   Obesity   Smoking , alcoholism , or drug use   Malnutrition   Recent illness   Diabetes   Heart or lung problems   Bleeding disorders   Use of certain medicines     Be sure to discuss these risks with your doctor before the test.     What to Expect   Prior to test   Leading up to the test:   Arrange for a ride home after the test. Also, arrange for help at home.   The night before, eat a light meal.  Do not eat or drink anything after midnight the night before the test.   Talk to your doctor about your medicines. You may be asked to stop taking some medicines up to one week before the procedure, like:   Anti-inflammatory drugs (e.g., aspirin )   Blood thinners, like clopidogrel

## 2024-02-12 NOTE — TELEPHONE ENCOUNTER
Scheduled pt for Colonoscopy & EGD 24 at 8:15AM. Pt needs to arrive at [facility] at 7:15AM. Patient confirmed date and time, address and directions given in office. Prep instructions given in office, patient understood.      Prior Authorization Form:      DEMOGRAPHICS:                     Patient Name:  Aldo Gamez  Patient :  1980            Insurance:  Payor: [facility] / Plan: [facility] MEDICAID / Product Type: *No Product type* /   Insurance ID Number:    Payer/Plan Subscr  Sex Relation Sub. Ins. ID Effective Group Num   [facility] ALDO GAMEZ 1980 Female Self [id number] 1/1/15 [facility] [address]         DIAGNOSIS & PROCEDURE:                       Procedure/Operation: EGD & COLONOSCOPY           CPT Code: 00773, 32789    Diagnosis:  GENERALIZED ABDOMINAL PAIN, NAUSEA & VOMITING, BLOOD IN STOOL    ICD10 Code: R10.84, R11.2, K92.1    Location:  [facility]    Surgeon:  VIJAY COURTNEY    SCHEDULING INFORMATION:                          Date: 24    Time: 8:15AM              Anesthesia:  LMAC                                                       Status:  OUTPATIENT       Special Comments:  N/A       Electronically signed by Amanda Ramírez MA on 2024 at 3:36 PM

## 2024-02-15 ENCOUNTER — TELEPHONE (OUTPATIENT)
Dept: SURGERY | Age: 44
End: 2024-02-15

## 2024-02-15 ENCOUNTER — HOSPITAL ENCOUNTER (EMERGENCY)
Age: 44
Discharge: HOME | End: 2024-02-15
Payer: COMMERCIAL

## 2024-02-15 VITALS
DIASTOLIC BLOOD PRESSURE: 86 MMHG | OXYGEN SATURATION: 98 % | RESPIRATION RATE: 18 BRPM | SYSTOLIC BLOOD PRESSURE: 134 MMHG | TEMPERATURE: 97.4 F | WEIGHT: 143 LBS | BODY MASS INDEX: 28.88 KG/M2 | HEART RATE: 95 BPM

## 2024-02-15 DIAGNOSIS — N30.00 ACUTE CYSTITIS WITHOUT HEMATURIA: ICD-10-CM

## 2024-02-15 DIAGNOSIS — R11.2 NAUSEA AND VOMITING, UNSPECIFIED VOMITING TYPE: ICD-10-CM

## 2024-02-15 DIAGNOSIS — R10.84 GENERALIZED ABDOMINAL PAIN: Primary | ICD-10-CM

## 2024-02-15 LAB
ALP SERPL-CCNC: 100 U/L (ref 35–104)
ALT SERPL-CCNC: 10 U/L (ref 0–32)
ANION GAP SERPL CALCULATED.3IONS-SCNC: 12 MMOL/L (ref 7–16)
AST SERPL-CCNC: 11 U/L (ref 0–31)
BACTERIA URNS QL MICRO: ABNORMAL
BASOPHILS # BLD: 0.12 K/UL (ref 0–0.2)
BASOPHILS NFR BLD: 1 % (ref 0–2)
BILIRUB SERPL-MCNC: <0.2 MG/DL (ref 0–1.2)
BILIRUB UR QL STRIP: NEGATIVE
BUN SERPL-MCNC: 7 MG/DL (ref 6–20)
CALCIUM SERPL-MCNC: 8.9 MG/DL (ref 8.6–10.2)
CHLORIDE SERPL-SCNC: 102 MMOL/L (ref 98–107)
CLARITY UR: CLEAR
CO2 SERPL-SCNC: 25 MMOL/L (ref 22–29)
COLOR UR: YELLOW
CREAT SERPL-MCNC: 0.8 MG/DL (ref 0.5–1)
EOSINOPHIL # BLD: 0.05 K/UL (ref 0.05–0.5)
EOSINOPHILS RELATIVE PERCENT: 0 % (ref 0–6)
EPI CELLS #/AREA URNS HPF: ABNORMAL /HPF
ERYTHROCYTE [DISTWIDTH] IN BLOOD BY AUTOMATED COUNT: 14.3 % (ref 11.5–15)
GFR SERPL CREATININE-BSD FRML MDRD: >60 ML/MIN/1.73M2
GLUCOSE SERPL-MCNC: 92 MG/DL (ref 74–99)
GLUCOSE UR STRIP-MCNC: NEGATIVE MG/DL
HCG, URINE, POC: NEGATIVE
HCT VFR BLD AUTO: 44.9 % (ref 34–48)
HGB BLD-MCNC: 15 G/DL (ref 11.5–15.5)
HGB UR QL STRIP.AUTO: NEGATIVE
IMM GRANULOCYTES # BLD AUTO: 0.1 K/UL (ref 0–0.58)
IMM GRANULOCYTES NFR BLD: 1 % (ref 0–5)
KETONES UR STRIP-MCNC: NEGATIVE MG/DL
LACTATE BLDV-SCNC: 1 MMOL/L (ref 0.5–2.2)
LEUKOCYTE ESTERASE UR QL STRIP: ABNORMAL
LYMPHOCYTES NFR BLD: 3.78 K/UL (ref 1.5–4)
LYMPHOCYTES RELATIVE PERCENT: 25 % (ref 20–42)
Lab: NORMAL
MCH RBC QN AUTO: 27.7 PG (ref 26–35)
MCHC RBC AUTO-ENTMCNC: 33.4 G/DL (ref 32–34.5)
MCV RBC AUTO: 83 FL (ref 80–99.9)
MONOCYTES NFR BLD: 1.35 K/UL (ref 0.1–0.95)
MONOCYTES NFR BLD: 9 % (ref 2–12)
NEGATIVE QC PASS/FAIL: NORMAL
NEUTROPHILS NFR BLD: 64 % (ref 43–80)
NEUTS SEG NFR BLD: 9.58 K/UL (ref 1.8–7.3)
NITRITE UR QL STRIP: NEGATIVE
PH UR STRIP: 5.5 [PH] (ref 5–9)
PLATELET # BLD AUTO: 440 K/UL (ref 130–450)
PMV BLD AUTO: 10.3 FL (ref 7–12)
POSITIVE QC PASS/FAIL: NORMAL
POTASSIUM SERPL-SCNC: 4 MMOL/L (ref 3.5–5)
PROT SERPL-MCNC: 7.4 G/DL (ref 6.4–8.3)
PROT UR STRIP-MCNC: NEGATIVE MG/DL
RBC # BLD AUTO: 5.41 M/UL (ref 3.5–5.5)
RBC #/AREA URNS HPF: ABNORMAL /HPF
SODIUM SERPL-SCNC: 139 MMOL/L (ref 132–146)
SP GR UR STRIP: 1.01 (ref 1–1.03)
UROBILINOGEN UR STRIP-ACNC: 0.2 EU/DL (ref 0–1)
WBC #/AREA URNS HPF: ABNORMAL /HPF
WBC OTHER # BLD: 15 K/UL (ref 4.5–11.5)

## 2024-02-15 PROCEDURE — 85025 COMPLETE CBC W/AUTO DIFF WBC: CPT

## 2024-02-15 PROCEDURE — 96372 THER/PROPH/DIAG INJ SC/IM: CPT

## 2024-02-15 PROCEDURE — 6370000000 HC RX 637 (ALT 250 FOR IP): Performed by: NURSE PRACTITIONER

## 2024-02-15 PROCEDURE — 2580000003 HC RX 258: Performed by: NURSE PRACTITIONER

## 2024-02-15 PROCEDURE — 87086 URINE CULTURE/COLONY COUNT: CPT

## 2024-02-15 PROCEDURE — 99284 EMERGENCY DEPT VISIT MOD MDM: CPT

## 2024-02-15 PROCEDURE — 80053 COMPREHEN METABOLIC PANEL: CPT

## 2024-02-15 PROCEDURE — 87088 URINE BACTERIA CULTURE: CPT

## 2024-02-15 PROCEDURE — 81001 URINALYSIS AUTO W/SCOPE: CPT

## 2024-02-15 PROCEDURE — 6360000002 HC RX W HCPCS: Performed by: NURSE PRACTITIONER

## 2024-02-15 PROCEDURE — 83605 ASSAY OF LACTIC ACID: CPT

## 2024-02-15 RX ORDER — PROMETHAZINE HYDROCHLORIDE 25 MG/1
25 TABLET ORAL EVERY 6 HOURS PRN
Qty: 10 TABLET | Refills: 0 | Status: SHIPPED | OUTPATIENT
Start: 2024-02-15 | End: 2024-02-18

## 2024-02-15 RX ORDER — CEPHALEXIN 500 MG/1
500 CAPSULE ORAL 3 TIMES DAILY
Qty: 21 CAPSULE | Refills: 0 | Status: SHIPPED | OUTPATIENT
Start: 2024-02-15 | End: 2024-02-22

## 2024-02-15 RX ORDER — 0.9 % SODIUM CHLORIDE 0.9 %
1000 INTRAVENOUS SOLUTION INTRAVENOUS ONCE
Status: COMPLETED | OUTPATIENT
Start: 2024-02-15 | End: 2024-02-15

## 2024-02-15 RX ORDER — ACETAMINOPHEN 500 MG
1000 TABLET ORAL ONCE
Status: COMPLETED | OUTPATIENT
Start: 2024-02-15 | End: 2024-02-15

## 2024-02-15 RX ORDER — CEPHALEXIN 500 MG/1
500 CAPSULE ORAL ONCE
Status: COMPLETED | OUTPATIENT
Start: 2024-02-15 | End: 2024-02-15

## 2024-02-15 RX ORDER — PROMETHAZINE HYDROCHLORIDE 25 MG/ML
25 INJECTION, SOLUTION INTRAMUSCULAR; INTRAVENOUS ONCE
Status: COMPLETED | OUTPATIENT
Start: 2024-02-15 | End: 2024-02-15

## 2024-02-15 RX ADMIN — SODIUM CHLORIDE 1000 ML: 9 INJECTION, SOLUTION INTRAVENOUS at 16:53

## 2024-02-15 RX ADMIN — ACETAMINOPHEN 1000 MG: 500 TABLET ORAL at 19:39

## 2024-02-15 RX ADMIN — PROMETHAZINE HYDROCHLORIDE 25 MG: 25 INJECTION INTRAMUSCULAR; INTRAVENOUS at 16:53

## 2024-02-15 RX ADMIN — CEPHALEXIN 500 MG: 500 CAPSULE ORAL at 19:39

## 2024-02-15 NOTE — ED NOTES
Department of Emergency Medicine  FIRST PROVIDER TRIAGE NOTE             Independent MLP           2/15/24  3:52 PM EST    Date of Encounter: 2/15/24   MRN: [id number]      HPI: Hannah Burnett is a 43 y.o. female who presents to the ED for Abdominal Pain (Ongoing for a week, back for the same, can't keep medicine down )  Presents for complaints of abdominal pain with nausea and vomiting which has been present for approximately week.  She does have a history of having this.  She is being followed by Dr. Uribe and is scheduled for an upper endoscopy in April.    ROS: Negative for cp or sob.    PE: Gen Appearance/Constitutional: alert  CV: regular rate     Initial Plan of Care: All treatment areas with department are currently occupied. Plan to order/Initiate the following while awaiting opening in ED: labs.  Initiate Treatment-Testing, Proceed toTreatment Area When Bed Available for ED Attending/MLP to Continue Care    Electronically signed by JANAE Johnson CNP   DD: 2/15/24       Mari Montoya APRN - CNP  02/15/24 [id number]    
Statement Selected

## 2024-02-15 NOTE — TELEPHONE ENCOUNTER
MA returned call to patient. Per patient she is experiencing increased lower abdominal pain radiating into her back, diarrhea and nausea, vomiting. Patient denies fever or chills.    Forwarding to Dr. Uribe for further advisement. Patient can be reached at [phone].  Electronically signed by Gayatri Matos MA on 2/15/24 at 2:05 PM EST

## 2024-02-15 NOTE — ED PROVIDER NOTES
primary care doctor as well as general surgeon.  Patient overall nontoxic, neurovascularly intact.  Patient expressed understanding of good follow-up she was also educated on clear liquid diet and to advance slowly and as tolerated and to avoid spicy, acidic and irritating foods.  Patient expressed understanding patient safely discharged.    History from : Patient and Medical records     Limitations to history : None    Chronic Conditions: has a past medical history of Anxiety, Asthma, Bipolar disorder (HCC), Depression, Diabetes mellitus (HCC), Neurofibromatosis (HCC), Obesity, and Sleep apnea.    CONSULTS:PCP    Discussion with Other Profesionals : None    Social Determinants : None    Records Reviewed : Source patient and Inpatient Notes epic medical records        Disposition Considerations (Tests not ordered but considered, Shared Decision Making, Pt Expectation of Test or Tx.):   Appropriate for outpatient management yes and Evaluation by myself and discharge recommended.      I am the Primary Clinician of Record.    Counseling:   The emergency provider has spoken with the patient and discussed today’s results, in addition to providing specific details for the plan of care and counseling regarding the diagnosis and prognosis.  Questions are answered at this time and they are agreeable with the plan.      --------------------------------- IMPRESSION AND DISPOSITION ---------------------------------    IMPRESSION  1. Generalized abdominal pain    2. Acute cystitis without hematuria    3. Nausea and vomiting, unspecified vomiting type        DISPOSITION  Disposition: Discharge to home  Patient condition is stable

## 2024-02-16 NOTE — TELEPHONE ENCOUNTER
----- Message from Henry Uribe MD sent at 2/16/2024  6:28 AM EST -----  Pt is to be scheduled for EGD/colonoscopy--nothing further to add until after procedures are performed.

## 2024-02-16 NOTE — DISCHARGE INSTRUCTIONS
Clear liquid diet, advance slowly and as tolerated, avoid spicy, acidic and irritating foods.  Take Phenergan to assist with nausea and vomiting as well as the antibiotic for the urinary tract infection.  Follow-up with primary care doctor and general surgeon next week

## 2024-02-16 NOTE — TELEPHONE ENCOUNTER
MA informed patient \"Pt is to be scheduled for EGD/colonoscopy--nothing further to add until after procedures are performed.\".  Patient verbalized understanding.  Electronically signed by Gayatri Matos MA on 2/16/24 at 9:42 AM EST

## 2024-02-18 LAB
MICROORGANISM SPEC CULT: ABNORMAL
SPECIMEN DESCRIPTION: ABNORMAL

## 2024-02-22 NOTE — PROGRESS NOTES
[facility] PRE-ADMISSION TESTING   COLONOSCOPY INSTRUCTIONS  PAT- Phone Number: [phone]    COLONOSCOPY INSTRUCTIONS:     [x] Bowel Prep instructions reviewed - any questions regarding your prep, please contact surgeon's office.  [x] Colonoscopy: 1-2 days prior: No solid foods. Clear liquids only - nothing red or purple in color.  [x] Antibacterial Soap Shower Night before or morning of procedure.  [x] Nothing by mouth after midnight. Including no gum, candy, mints, or water.   [x] Do not wear makeup, lotions, powders, deodorant.   [x] No tobacco products, illegal drugs, or alcohol within 24 hours of your surgery.  [x] Jewelry or valuables should not be brought to the hospital. All body and/or tongue piercing's must be removed prior to arriving to hospital. No contact lens or hair pins.   [x] Urine Pregnancy test will be preformed the day of surgery. A specimen sample may be brought from home.  [x] Arrange transportation with a responsible adult  to and from the hospital. If you do not have a responsible adult  to transport you, you will need to make arrangements with a medical transportation company. Arrange for someone to be with you for the remainder of the day and for 24 hours after your procedure due to having had anesthesia.    -Who will be your  for transportation? Jass  -Who will be staying with you for 24 hrs after your procedure?   [x] Bring insurance card and photo ID.  [] Bring copy of living will or healthcare power of  papers to be placed in your electronic record.    PARKING INSTRUCTIONS:     [x] ARRIVAL DATE & TIME: 2/27 @ 0730am  [x] Times are subject to change. We will contact you the business day before surgery if that were to occur.  [x] Enter into the [facility] Entrance. Two people may accompany you. Masks are not required.  [x] Parking Lot \"I\" is where you will park. It is located on the corner of [facility]

## 2024-02-27 ENCOUNTER — HOSPITAL ENCOUNTER (OUTPATIENT)
Age: 44
Setting detail: OUTPATIENT SURGERY
Discharge: HOME | End: 2024-02-27
Attending: SURGERY | Admitting: SURGERY
Payer: COMMERCIAL

## 2024-02-27 ENCOUNTER — ANESTHESIA (OUTPATIENT)
Dept: ENDOSCOPY | Age: 44
End: 2024-02-27
Payer: COMMERCIAL

## 2024-02-27 ENCOUNTER — ANESTHESIA EVENT (OUTPATIENT)
Dept: ENDOSCOPY | Age: 44
End: 2024-02-27
Payer: COMMERCIAL

## 2024-02-27 VITALS
BODY MASS INDEX: 28.22 KG/M2 | TEMPERATURE: 96.9 F | DIASTOLIC BLOOD PRESSURE: 61 MMHG | OXYGEN SATURATION: 94 % | RESPIRATION RATE: 16 BRPM | WEIGHT: 140 LBS | HEART RATE: 86 BPM | SYSTOLIC BLOOD PRESSURE: 118 MMHG | HEIGHT: 59 IN

## 2024-02-27 DIAGNOSIS — K92.1 BLOOD IN STOOL: ICD-10-CM

## 2024-02-27 DIAGNOSIS — R10.84 ABDOMINAL PAIN, GENERALIZED: ICD-10-CM

## 2024-02-27 DIAGNOSIS — Z01.812 PRE-OPERATIVE LABORATORY EXAMINATION: Primary | ICD-10-CM

## 2024-02-27 LAB
GLUCOSE BLD-MCNC: 121 MG/DL (ref 74–99)
HCG, URINE, POC: NEGATIVE
Lab: NORMAL
NEGATIVE QC PASS/FAIL: NORMAL
POSITIVE QC PASS/FAIL: NORMAL

## 2024-02-27 PROCEDURE — 3609012400 HC EGD TRANSORAL BIOPSY SINGLE/MULTIPLE: Performed by: SURGERY

## 2024-02-27 PROCEDURE — 7100000011 HC PHASE II RECOVERY - ADDTL 15 MIN: Performed by: SURGERY

## 2024-02-27 PROCEDURE — 2709999900 HC NON-CHARGEABLE SUPPLY: Performed by: SURGERY

## 2024-02-27 PROCEDURE — 2580000003 HC RX 258: Performed by: SURGERY

## 2024-02-27 PROCEDURE — 3700000001 HC ADD 15 MINUTES (ANESTHESIA): Performed by: SURGERY

## 2024-02-27 PROCEDURE — 88305 TISSUE EXAM BY PATHOLOGIST: CPT

## 2024-02-27 PROCEDURE — 6360000002 HC RX W HCPCS: Performed by: NURSE ANESTHETIST, CERTIFIED REGISTERED

## 2024-02-27 PROCEDURE — 7100000010 HC PHASE II RECOVERY - FIRST 15 MIN: Performed by: SURGERY

## 2024-02-27 PROCEDURE — 43235 EGD DIAGNOSTIC BRUSH WASH: CPT | Performed by: SURGERY

## 2024-02-27 PROCEDURE — 6370000000 HC RX 637 (ALT 250 FOR IP): Performed by: SURGERY

## 2024-02-27 PROCEDURE — 3609010400 HC COLONOSCOPY POLYPECTOMY HOT BIOPSY: Performed by: SURGERY

## 2024-02-27 PROCEDURE — 3700000000 HC ANESTHESIA ATTENDED CARE: Performed by: SURGERY

## 2024-02-27 PROCEDURE — 82962 GLUCOSE BLOOD TEST: CPT

## 2024-02-27 PROCEDURE — 3609010600 HC COLONOSCOPY POLYPECTOMY SNARE/COLD BIOPSY: Performed by: SURGERY

## 2024-02-27 PROCEDURE — 45385 COLONOSCOPY W/LESION REMOVAL: CPT | Performed by: SURGERY

## 2024-02-27 RX ORDER — SODIUM CHLORIDE 0.9 % (FLUSH) 0.9 %
5-40 SYRINGE (ML) INJECTION EVERY 12 HOURS SCHEDULED
Status: DISCONTINUED | OUTPATIENT
Start: 2024-02-27 | End: 2024-02-27 | Stop reason: HOSPADM

## 2024-02-27 RX ORDER — SODIUM CHLORIDE 0.9 % (FLUSH) 0.9 %
5-40 SYRINGE (ML) INJECTION PRN
Status: DISCONTINUED | OUTPATIENT
Start: 2024-02-27 | End: 2024-02-27 | Stop reason: HOSPADM

## 2024-02-27 RX ORDER — SODIUM CHLORIDE 9 MG/ML
INJECTION, SOLUTION INTRAVENOUS CONTINUOUS
Status: DISCONTINUED | OUTPATIENT
Start: 2024-02-27 | End: 2024-02-27 | Stop reason: HOSPADM

## 2024-02-27 RX ORDER — SIMETHICONE 40MG/0.6ML
SUSPENSION, DROPS(FINAL DOSAGE FORM)(ML) ORAL PRN
Status: DISCONTINUED | OUTPATIENT
Start: 2024-02-27 | End: 2024-02-27 | Stop reason: ALTCHOICE

## 2024-02-27 RX ORDER — PROPOFOL 10 MG/ML
INJECTION, EMULSION INTRAVENOUS PRN
Status: DISCONTINUED | OUTPATIENT
Start: 2024-02-27 | End: 2024-02-27 | Stop reason: SDUPTHER

## 2024-02-27 RX ORDER — FENTANYL CITRATE 50 UG/ML
INJECTION, SOLUTION INTRAMUSCULAR; INTRAVENOUS PRN
Status: DISCONTINUED | OUTPATIENT
Start: 2024-02-27 | End: 2024-02-27 | Stop reason: SDUPTHER

## 2024-02-27 RX ORDER — SODIUM CHLORIDE 9 MG/ML
INJECTION, SOLUTION INTRAVENOUS PRN
Status: DISCONTINUED | OUTPATIENT
Start: 2024-02-27 | End: 2024-02-27 | Stop reason: HOSPADM

## 2024-02-27 RX ADMIN — PROPOFOL 580 MG: 10 INJECTION, EMULSION INTRAVENOUS at 09:12

## 2024-02-27 RX ADMIN — FENTANYL CITRATE 50 MCG: 50 INJECTION, SOLUTION INTRAMUSCULAR; INTRAVENOUS at 09:29

## 2024-02-27 RX ADMIN — SODIUM CHLORIDE: 9 INJECTION, SOLUTION INTRAVENOUS at 09:05

## 2024-02-27 RX ADMIN — FENTANYL CITRATE 50 MCG: 50 INJECTION, SOLUTION INTRAMUSCULAR; INTRAVENOUS at 09:23

## 2024-02-27 ASSESSMENT — LIFESTYLE VARIABLES: SMOKING_STATUS: 1

## 2024-02-27 ASSESSMENT — COPD QUESTIONNAIRES: CAT_SEVERITY: MILD

## 2024-02-27 ASSESSMENT — PAIN - FUNCTIONAL ASSESSMENT
PAIN_FUNCTIONAL_ASSESSMENT: 0-10
PAIN_FUNCTIONAL_ASSESSMENT: NONE - DENIES PAIN

## 2024-02-27 NOTE — H&P
[facility]  History and Physical     Patient's Name/Date of Birth: Hannah GONZALES Lex / 1980 (43 y.o.)     PCP:  Dr. Vazquez     Chief Complaint:  abd pain     History of Present Illness:  43 yr old female here with complaint of ongoing abd pain.  Previously underwent EGD 3/2023--found to have chronic gastritis.  Pt was seen in ED last week due to ongoing abd pain.  States pain is in her mid abdomen.  Reports she was also having black stools with some blood clots in her stool.  Pt has been on PPI/carafate with no relief.  States she took one of her friends \"Crohn's pills and it kind of made me feel better.\"  Complains of nausea and vomiting after eating.       Past Medical History        Past Medical History:   Diagnosis Date    Anxiety      Asthma      Bipolar disorder (HCC)      Depression      Diabetes mellitus (HCC)      Neurofibromatosis (HCC)      Obesity      Sleep apnea              Past Surgical History         Past Surgical History:   Procedure Laterality Date     SECTION        ESOPHAGOGASTRODUODENOSCOPY   2023     minimal gastritis--ravi    FOOT SURGERY        TUBAL LIGATION        UPPER GASTROINTESTINAL ENDOSCOPY N/A 3/24/2023     EGD BIOPSY performed by Henry Uribe [place] [facility]    WISDOM TOOTH EXTRACTION                Family History         Family History   Problem Relation Age of Onset    Breast Cancer Mother      Diabetes Mother      Hypotension Mother      Dementia Mother      No Known Problems Father              Social History               Socioeconomic History    Marital status: Single       Spouse name: Not on file    Number of children: Not on file    Years of education: Not on file    Highest education level: Not on file   Occupational History    Not on file   Tobacco Use    Smoking status: Every Day       Current packs/day: 1.00       Types: Cigarettes    Smokeless tobacco: Never   Vaping Use    Vaping Use: Not on file   Substance and Sexual Activity     Alcohol use: Not Currently       Alcohol/week: 7.0 standard drinks of alcohol       Types: 7 Shots of liquor per week    Drug use: Not Currently       Types: Marijuana (Weed)       Comment: edibles on occasion, 3/22/23 Malika has not been using wed for about 2 months    Sexual activity: Not on file   Other Topics Concern    Not on file   Social History Narrative    Not on file      Social Determinants of Health      Financial Resource Strain: Not on file   Food Insecurity: Not on file   Transportation Needs: Not on file   Physical Activity: Not on file   Stress: Not on file   Social Connections: Not on file   Intimate Partner Violence: Not on file   Housing Stability: Not on file            Current Facility-Administered Medications          Current Outpatient Medications   Medication Sig Dispense Refill    FLUoxetine (PROZAC) 20 MG capsule Take 1 capsule by mouth daily        pantoprazole (PROTONIX) 40 MG tablet Take 1 tablet by mouth daily (with breakfast) for 14 days 14 tablet 0    sucralfate (CARAFATE) 1 GM tablet Take 1 tablet by mouth every 6 hours 20 tablet 0    ondansetron (ZOFRAN-ODT) 4 MG disintegrating tablet Take 1 tablet by mouth 3 times daily as needed for Nausea or Vomiting 14 tablet 0    [START ON 2/16/2024] ARIPiprazole ER (ABILIFY MAINTENA) 400 MG SRER Inject 400 mg into the muscle every 30 days        naproxen (NAPROSYN) 500 MG tablet Take 1 tablet by mouth 2 times daily (with meals) (Patient not taking: Reported on 2/12/2024) 20 tablet 0    omeprazole (PRILOSEC OTC) 20 MG tablet Take 1 tablet by mouth daily (Patient not taking: Reported on 2/12/2024) 30 tablet 3    buPROPion (WELLBUTRIN) 100 MG tablet Take 1 tablet by mouth daily (Patient not taking: Reported on 2/12/2024)        benztropine mesylate (COGENTIN) 1 MG/ML injection Inject 1 mg into the muscle every 30 days (Patient not taking: Reported on 2/8/2024)          No current facility-administered medications for this visit.

## 2024-02-27 NOTE — ANESTHESIA POSTPROCEDURE EVALUATION
Department of Anesthesiology  Postprocedure Note    Patient: Hannah Burnett  MRN: [id number]  YOB: 1980  Date of evaluation: 2/27/2024    Procedure Summary       Date: 02/27/24 Room / Location: [place] [address] / [facility]    Anesthesia Start: 0907 Anesthesia Stop: 0959    Procedures:       ESOPHAGOGASTRODUODENOSCOPY BIOPSY      COLONOSCOPY POLYPECTOMY SNARE/COLD BIOPSY      COLONOSCOPY POLYPECTOMY HOT BIOPSY Diagnosis:       Abdominal pain, generalized      Blood in stool      (Abdominal pain, generalized [R10.84])      (Blood in stool [K92.1])    Surgeons: Henry Uribe MD Responsible Provider: Cesia Yoon MD    Anesthesia Type: MAC ASA Status: 3            Anesthesia Type: No value filed.    Graeme Phase I: Graeme Score: 10    Graeme Phase II: Graeme Score: 10    Anesthesia Post Evaluation    Patient location during evaluation: PACU  Patient participation: complete - patient participated  Level of consciousness: awake  Airway patency: patent  Nausea & Vomiting: no nausea and no vomiting  Cardiovascular status: hemodynamically stable  Respiratory status: acceptable  Hydration status: euvolemic  Pain management: adequate    No notable events documented.

## 2024-02-27 NOTE — ANESTHESIA PRE PROCEDURE
Department of Anesthesiology  Preprocedure Note       Name:  Hannah Burnett   Age:  43 y.o.  :  1980                                          MRN:  [id number]         Date:  2024      Surgeon: Surgeon(s):  Henry Uribe MD    Procedure: Procedure(s):  ESOPHAGOGASTRODUODENOSCOPY BIOPSY  COLONOSCOPY DIAGNOSTIC    Medications prior to admission:   Prior to Admission medications    Medication Sig Start Date End Date Taking? Authorizing Provider   FLUoxetine (PROZAC) 20 MG capsule Take 1 capsule by mouth daily    Tian Mandujano MD   pantoprazole (PROTONIX) 40 MG tablet Take 1 tablet by mouth daily (with breakfast) for 14 days 24  Kirsty Lynn MD   sucralfate (CARAFATE) 1 GM tablet Take 1 tablet by mouth every 6 hours 24   Kirsty Lynn MD   ondansetron (ZOFRAN-ODT) 4 MG disintegrating tablet Take 1 tablet by mouth 3 times daily as needed for Nausea or Vomiting  Patient not taking: Reported on 2024   Kirsty Lynn MD   buPROPion (WELLBUTRIN) 100 MG tablet Take 1 tablet by mouth daily  Patient not taking: Reported on 2024    Tian Mandujano MD   ARIPiprazole ER (ABILIFY MAINTENA) 400 MG SRER Inject 400 mg into the muscle every 2 months 24   Tian Mandujano MD       Current medications:    Current Facility-Administered Medications   Medication Dose Route Frequency Provider Last Rate Last Admin    0.9 % sodium chloride infusion   IntraVENous Continuous Henry Uribe MD        sodium chloride flush 0.9 % injection 5-40 mL  5-40 mL IntraVENous 2 times per day Henry Uribe MD        sodium chloride flush 0.9 % injection 5-40 mL  5-40 mL IntraVENous PRN Henry Uribe MD        0.9 % sodium chloride infusion   IntraVENous PRN Henry Uribe MD           Allergies:    Allergies   Allergen Reactions    Flagyl [Metronidazole]     Tetracyclines & Related        Problem List:    Patient Active Problem List   Diagnosis Code    Mild

## 2024-02-27 NOTE — DISCHARGE INSTRUCTIONS
Call [phone] for any questions/concerns.        Upper GI Endoscopy: What to Expect at Home  Your Recovery  You had an upper GI endoscopy. Your doctor used a thin, lighted tube that bends to look at the inside of your esophagus, your stomach, and the first part of the small intestine, called the duodenum.  After you have an endoscopy, you will stay at the hospital or clinic for 1 to 2 hours. This will allow the medicine to wear off. You will be able to go home after your doctor or nurse checks to make sure that you're not having any problems.  You may have to stay overnight if you had treatment during the test. You may have a sore throat for a day or two after the test.  This care sheet gives you a general idea about what to expect after the test.  How can you care for yourself at home?  Activity   Rest as much as you need to after you go home.  You should be able to go back to your usual activities the day after the test.  Diet   Follow your doctor's directions for eating after the test.  Drink plenty of fluids (unless your doctor has told you not to).  Medications   If you have a sore throat the day after the test, use an over-the-counter spray to numb your throat.  Follow-up care is a key part of your treatment and safety. Be sure to make and go to all appointments, and call your doctor if you are having problems. It's also a good idea to know your test results and keep a list of the medicines you take.  When should you call for help?   Call 911 anytime you think you may need emergency care. For example, call if:    You passed out (lost consciousness).     You have trouble breathing.     You pass maroon or bloody stools.   Call your doctor now or seek immediate medical care if:    You have pain that does not get better after your take pain medicine.     You have new or worse belly pain.     You have blood in your stools.     You are sick to your stomach and cannot keep fluids down.     You have a fever.

## 2024-02-29 ENCOUNTER — OFFICE VISIT (OUTPATIENT)
Dept: OBGYN | Age: 44
End: 2024-02-29
Payer: COMMERCIAL

## 2024-02-29 VITALS
HEIGHT: 59 IN | BODY MASS INDEX: 29.23 KG/M2 | WEIGHT: 145 LBS | HEART RATE: 84 BPM | DIASTOLIC BLOOD PRESSURE: 72 MMHG | SYSTOLIC BLOOD PRESSURE: 140 MMHG

## 2024-02-29 DIAGNOSIS — N93.9 ABNORMAL UTERINE BLEEDING: ICD-10-CM

## 2024-02-29 DIAGNOSIS — Z12.4 SCREENING FOR CERVICAL CANCER: Primary | ICD-10-CM

## 2024-02-29 DIAGNOSIS — Z01.419 ENCOUNTER FOR WELL WOMAN EXAM: ICD-10-CM

## 2024-02-29 DIAGNOSIS — N64.52 NIPPLE DISCHARGE: ICD-10-CM

## 2024-02-29 DIAGNOSIS — Z12.31 ENCOUNTER FOR SCREENING MAMMOGRAM FOR BREAST CANCER: ICD-10-CM

## 2024-02-29 DIAGNOSIS — Z11.3 SCREENING FOR STD (SEXUALLY TRANSMITTED DISEASE): ICD-10-CM

## 2024-02-29 DIAGNOSIS — N63.42 SUBAREOLAR MASS OF LEFT BREAST: ICD-10-CM

## 2024-02-29 LAB
HCG QUANTITATIVE: 1.7 MIU/ML (ref 0–7)
HCT VFR BLD CALC: 44.6 % (ref 34–48)
HEMOGLOBIN: 14.2 G/DL (ref 11.5–15.5)
MCH RBC QN AUTO: 27.2 PG (ref 26–35)
MCHC RBC AUTO-ENTMCNC: 31.8 G/DL (ref 32–34.5)
MCV RBC AUTO: 85.3 FL (ref 80–99.9)
PDW BLD-RTO: 14.3 % (ref 11.5–15)
PLATELET # BLD: 524 K/UL (ref 130–450)
PMV BLD AUTO: 10.5 FL (ref 7–12)
PROLACTIN: 8.93 NG/ML
RBC # BLD: 5.23 M/UL (ref 3.5–5.5)
TSH SERPL DL<=0.05 MIU/L-ACNC: 0.75 UIU/ML (ref 0.27–4.2)
WBC # BLD: 10.5 K/UL (ref 4.5–11.5)

## 2024-02-29 PROCEDURE — G8484 FLU IMMUNIZE NO ADMIN: HCPCS | Performed by: OBSTETRICS & GYNECOLOGY

## 2024-02-29 PROCEDURE — 36415 COLL VENOUS BLD VENIPUNCTURE: CPT | Performed by: OBSTETRICS & GYNECOLOGY

## 2024-02-29 PROCEDURE — 99386 PREV VISIT NEW AGE 40-64: CPT | Performed by: OBSTETRICS & GYNECOLOGY

## 2024-02-29 SDOH — ECONOMIC STABILITY: FOOD INSECURITY: WITHIN THE PAST 12 MONTHS, YOU WORRIED THAT YOUR FOOD WOULD RUN OUT BEFORE YOU GOT MONEY TO BUY MORE.: NEVER TRUE

## 2024-02-29 SDOH — ECONOMIC STABILITY: INCOME INSECURITY: HOW HARD IS IT FOR YOU TO PAY FOR THE VERY BASICS LIKE FOOD, HOUSING, MEDICAL CARE, AND HEATING?: NOT HARD AT ALL

## 2024-02-29 SDOH — ECONOMIC STABILITY: FOOD INSECURITY: WITHIN THE PAST 12 MONTHS, THE FOOD YOU BOUGHT JUST DIDN'T LAST AND YOU DIDN'T HAVE MONEY TO GET MORE.: NEVER TRUE

## 2024-02-29 NOTE — PROGRESS NOTES
Obtained specimen via right antecubital. Skin intact, no redness or swelling noted at venipuncture site. No complaints of discomfort voiced by patient. All specimens labeled and sent to lab. Follow up EMB scheduled. Discharge instructions have been discussed with the patient. Patient advised to call our office with any questions or concerns.   Voiced understanding.

## 2024-02-29 NOTE — PROGRESS NOTES
Hannah Burnett     Patient presents for annual exam.     States her last pap smear was 5 years. Had an abnormal pap smear in her teens with a colpo at that time.     Patient has a history of HSV, last outbreak 9 years ago.     Patient is requesting STD testing.     Patient notices green fluid that can be expressed from her left nipple. And has noticed a growth as well. Cannot tell how long it has been there. It is painful and has gotten bigger.     Patient has irrefular cycles. States she can skip some and when she gets them they are heavy.      Past Medical History:   Diagnosis Date    Anxiety     Asthma     Bipolar disorder (HCC)     Depression     Diabetes mellitus (HCC)     Neurofibromatosis (HCC)     Obesity     Sleep apnea         Past Surgical History:   Procedure Laterality Date     SECTION      COLONOSCOPY N/A 2024    COLONOSCOPY POLYPECTOMY SNARE/COLD BIOPSY performed by [facility] [place] [facility]    COLONOSCOPY  2024    COLONOSCOPY POLYPECTOMY HOT BIOPSY performed by Henry Uribe MD at [facility]    COLONOSCOPY W/ POLYPECTOMY  2024    polyps--ravi    ESOPHAGOGASTRODUODENOSCOPY  2023    minimal gastritis--ravi    ESOPHAGOGASTRODUODENOSCOPY  2024    normal--ravi    FOOT SURGERY      TUBAL LIGATION      UPPER GASTROINTESTINAL ENDOSCOPY N/A 2023    EGD BIOPSY performed by [facility] Uribe MD at [facility]    UPPER GASTROINTESTINAL ENDOSCOPY N/A 2024    ESOPHAGOGASTRODUODENOSCOPY BIOPSY performed by Henry Uribe MD at [facility]    WISDOM TOOTH EXTRACTION          Family History   Adopted: Yes   Problem Relation Age of Onset    Breast Cancer Mother     Diabetes Mother     Hypotension Mother     Dementia Mother     No Known Problems Father           Current Outpatient Medications:     sucralfate (CARAFATE) 1 GM tablet, Take 1 tablet by mouth every 6 hours, Disp: 20 tablet, Rfl: 0    ondansetron (ZOFRAN-ODT) 4 MG disintegrating tablet, Take

## 2024-03-01 ENCOUNTER — TELEPHONE (OUTPATIENT)
Dept: BREAST CENTER | Age: 44
End: 2024-03-01

## 2024-03-01 LAB — SURGICAL PATHOLOGY REPORT: NORMAL

## 2024-03-01 NOTE — TELEPHONE ENCOUNTER
Patient is a new referral for left breast changes and lump/ discharged left breast. Left message with call back number to discuss. Patient more than likely will need bilateral diagnostic mammogram and left breast ultrasound unless done recently. Once completed can get her scheduled as reccomendations.    Electronically signed by Luciana Umanzor RN on 3/1/24 at 11:38 AM EST

## 2024-03-04 ENCOUNTER — TELEPHONE (OUTPATIENT)
Dept: BREAST CENTER | Age: 44
End: 2024-03-04

## 2024-03-04 DIAGNOSIS — N63.20 MASS OF LEFT BREAST, UNSPECIFIED QUADRANT: Primary | ICD-10-CM

## 2024-03-04 NOTE — TELEPHONE ENCOUNTER
Second message left in attempt to reach patient regarding her referral to the breast clinic. Call back number provided.

## 2024-03-04 NOTE — TELEPHONE ENCOUNTER
Patient returned call. discussed referral information with patient. She is agreeable to have breast imaging first then clinical follow up with one of our providers depending on recommendations. orders taken to schedulers. They will call patient to schedule.    Electronically signed by Luciana Umanzor RN on 3/4/24 at 10:11 AM EST

## 2024-03-06 LAB — GYNECOLOGY CYTOLOGY REPORT: NORMAL

## 2024-03-07 DIAGNOSIS — Z11.3 SCREENING FOR STD (SEXUALLY TRANSMITTED DISEASE): ICD-10-CM

## 2024-03-08 ENCOUNTER — HOSPITAL ENCOUNTER (OUTPATIENT)
Dept: ULTRASOUND IMAGING | Age: 44
End: 2024-03-08
Attending: OBSTETRICS & GYNECOLOGY
Payer: COMMERCIAL

## 2024-03-08 DIAGNOSIS — N93.8 DUB (DYSFUNCTIONAL UTERINE BLEEDING): ICD-10-CM

## 2024-03-08 DIAGNOSIS — N93.9 ABNORMAL UTERINE BLEEDING: ICD-10-CM

## 2024-03-08 PROCEDURE — 76856 US EXAM PELVIC COMPLETE: CPT

## 2024-03-08 PROCEDURE — 76830 TRANSVAGINAL US NON-OB: CPT

## 2024-03-12 RX ORDER — METRONIDAZOLE 7.5 MG/G
1 GEL VAGINAL DAILY
Qty: 5 EACH | Refills: 0 | Status: SHIPPED | OUTPATIENT
Start: 2024-03-12 | End: 2024-03-17

## 2024-03-12 RX ORDER — AZITHROMYCIN 500 MG/1
500 TABLET, FILM COATED ORAL DAILY
Qty: 5 TABLET | Refills: 0 | Status: SHIPPED | OUTPATIENT
Start: 2024-03-12 | End: 2024-03-17

## 2024-03-21 ENCOUNTER — TELEPHONE (OUTPATIENT)
Dept: BREAST CENTER | Age: 44
End: 2024-03-21

## 2024-03-21 NOTE — TELEPHONE ENCOUNTER
Touched base with patient. She said she has not been in contact with the schedulers to schedule her imaging. Patient transferred to schedulers to coordinate.

## 2024-04-05 ENCOUNTER — TELEPHONE (OUTPATIENT)
Dept: BREAST CENTER | Age: 44
End: 2024-04-05

## 2024-04-05 NOTE — TELEPHONE ENCOUNTER
Spoke with patient in regards to rescheduling her breast imaging. She states she was sick the day she was suppose to come in on 4/2. She is ready to reschedule now. Patient transferred to [facility] schedulers to schedule.

## 2024-05-01 ENCOUNTER — TELEPHONE (OUTPATIENT)
Dept: BREAST CENTER | Age: 44
End: 2024-05-01

## 2024-05-01 NOTE — TELEPHONE ENCOUNTER
Called and spoke with patient regarding her missed appointment for breast imaging. Transferred patient to schedulers to get rescheduled. Patient is a new referral for left breast mass.    Electronically signed by Luciana Umanzor RN on 5/1/24 at 10:00 AM EDT

## 2024-05-21 ENCOUNTER — TELEPHONE (OUTPATIENT)
Dept: BREAST CENTER | Age: 44
End: 2024-05-21

## 2024-05-21 NOTE — TELEPHONE ENCOUNTER
Touched base with patient in regards to her breast imaging that has not been rescheduled. Patient has not returned [facility] schedulers call. Patient ready to reschedule now. Patient transferred to schedulers to coordinate.

## 2024-05-29 ENCOUNTER — TELEPHONE (OUTPATIENT)
Dept: BREAST CENTER | Age: 44
End: 2024-05-29

## 2024-05-29 NOTE — TELEPHONE ENCOUNTER
Patient has not been scheduled for her imaging yet. Spoke to her and she states she has trouble getting through. I told her that the [facility] schedulers will reach out to her today. Notified Laura in scheduling who will call patient shortly.

## 2024-06-19 ENCOUNTER — HOSPITAL ENCOUNTER (OUTPATIENT)
Dept: GENERAL RADIOLOGY | Age: 44
Discharge: HOME | End: 2024-06-21
Attending: SURGERY
Payer: COMMERCIAL

## 2024-06-19 VITALS — BODY MASS INDEX: 29.23 KG/M2 | HEIGHT: 59 IN | WEIGHT: 145 LBS

## 2024-06-19 DIAGNOSIS — N63.20 MASS OF LEFT BREAST, UNSPECIFIED QUADRANT: ICD-10-CM

## 2024-06-19 PROCEDURE — 76642 ULTRASOUND BREAST LIMITED: CPT

## 2024-06-19 PROCEDURE — G0279 TOMOSYNTHESIS, MAMMO: HCPCS

## 2024-06-20 ENCOUNTER — TELEPHONE (OUTPATIENT)
Dept: BREAST CENTER | Age: 44
End: 2024-06-20

## 2024-06-20 NOTE — TELEPHONE ENCOUNTER
Called and left detailed message with call back number to schedule a clinical appointment after her recent breast imaging.(Benign, with normal interval 12 month follow up).  Patient had complained of left breast lump and nipple discharge. Will await call back to discuss further and schedule.    Electronically signed by Luciana Umanzor RN on 6/20/24 at 10:53 AM EDT

## 2024-06-24 ENCOUNTER — TELEPHONE (OUTPATIENT)
Dept: BREAST CENTER | Age: 44
End: 2024-06-24

## 2024-06-24 NOTE — TELEPHONE ENCOUNTER
Called a seccond time and left detailed message with call back number to schedule a clinical appointment after her recent breast imaging.(Benign, with normal interval 12 month follow up).   Patient had complained of left breast lump and nipple discharge. Will await call back to discuss further and schedule.      Electronically signed by Luciana Umanzor RN on 6/24/24 at 9:13 AM EDT

## 2024-07-01 ENCOUNTER — TELEPHONE (OUTPATIENT)
Dept: BREAST CENTER | Age: 44
End: 2024-07-01

## 2024-07-01 NOTE — TELEPHONE ENCOUNTER
Called and spoke with patient regarding her recent breast imaging and scheduling. Patient denies any nipple discharge, breast pain or any other symptoms at this time. She is declining a clinical follow up. Told patient to follow up with any issues with referring provider. PCP will be notified.    Electronically signed by Luciana Umanzor RN on 7/1/24 at 8:57 AM EDT

## 2025-04-15 ENCOUNTER — APPOINTMENT (OUTPATIENT)
Dept: GENERAL RADIOLOGY | Age: 45
End: 2025-04-15
Payer: COMMERCIAL

## 2025-04-15 ENCOUNTER — HOSPITAL ENCOUNTER (EMERGENCY)
Age: 45
Discharge: HOME | End: 2025-04-15
Payer: COMMERCIAL

## 2025-04-15 VITALS
BODY MASS INDEX: 29.29 KG/M2 | SYSTOLIC BLOOD PRESSURE: 125 MMHG | RESPIRATION RATE: 19 BRPM | WEIGHT: 145 LBS | DIASTOLIC BLOOD PRESSURE: 89 MMHG | HEART RATE: 78 BPM | OXYGEN SATURATION: 98 % | TEMPERATURE: 97.4 F

## 2025-04-15 DIAGNOSIS — S83.91XA SPRAIN OF RIGHT KNEE, UNSPECIFIED LIGAMENT, INITIAL ENCOUNTER: ICD-10-CM

## 2025-04-15 DIAGNOSIS — M25.561 ACUTE PAIN OF RIGHT KNEE: Primary | ICD-10-CM

## 2025-04-15 PROCEDURE — 99283 EMERGENCY DEPT VISIT LOW MDM: CPT

## 2025-04-15 PROCEDURE — 73562 X-RAY EXAM OF KNEE 3: CPT

## 2025-04-15 PROCEDURE — 73590 X-RAY EXAM OF LOWER LEG: CPT

## 2025-04-15 PROCEDURE — 73552 X-RAY EXAM OF FEMUR 2/>: CPT

## 2025-04-15 RX ORDER — NAPROXEN 500 MG/1
500 TABLET ORAL 2 TIMES DAILY PRN
Qty: 14 TABLET | Refills: 0 | Status: SHIPPED | OUTPATIENT
Start: 2025-04-15 | End: 2025-04-22

## 2025-04-15 NOTE — ED PROVIDER NOTES
Independent ZACH Visit.           [facility] EMERGENCY DEPARTMENT  ED  Encounter Note  Admit Date/RoomTime: 4/15/2025 11:09 AM  ED Room: PR4/PR4  NAME: Hannah Burnett  : 1980  MRN: [id number]  PCP: Yrn Vazquez MD    CHIEF COMPLAINT     Knee Pain (Right knee pain that started yesterday after twisting it. )    HISTORY OF PRESENT ILLNESS        Hannah Burnett is a 44 y.o. female who presents to the ED by private vehicle for right knee pain since yesterday.  Patient states she was walking at the store when she felt her knee buckle and pop.  Patient denies falling down.  Patient is not complaining of knee pain that is mostly medial proximal to the knee joint.  Patient also reports that her knee is swollen.  She has not tried anything for the pain.  She denies any radiation of pain.  Patient reports she is unable to bear weight on that knee and cannot bend it without pain.  Patient does report a history of a right knee injury in the past and has gone to physical therapy for it.  The complaint has been stable and are moderate in severity.      REVIEW OF SYSTEMS     Pertinent positives and negatives are stated within HPI, all other systems reviewed and are negative.    Past Medical History:  has a past medical history of Anxiety, Asthma, Bipolar disorder (HCC), Depression, Diabetes mellitus (HCC), Neurofibromatosis (HCC), Obesity, and Sleep apnea.  Surgical History:  has a past surgical history that includes  section; Tubal ligation; Foot surgery; [place] tooth extraction; Esophagogastroduodenoscopy (2023); Upper gastrointestinal endoscopy (N/A, 2023); Esophagogastroduodenoscopy (2024); Colonoscopy w/ polypectomy (2024); Upper gastrointestinal endoscopy (N/A, 2024); Colonoscopy (N/A, 2024); and Colonoscopy (2024).  Social History:  reports that she has been smoking cigarettes. She has never used smokeless tobacco. She reports that she does not

## 2025-04-15 NOTE — DISCHARGE INSTRUCTIONS
No acute bony abnormality or fracture noted.  Please wear the knee immobilizer and follow-up with your family doctor as well as an orthopedic.  Rest, ice, compression and Tylenol alternating with naproxen every 6-8 hours for pain control.

## 2025-05-28 ENCOUNTER — TELEPHONE (OUTPATIENT)
Age: 45
End: 2025-05-28

## 2025-05-28 NOTE — TELEPHONE ENCOUNTER
Future Appointments   Date Time Provider Department Center   5/29/2025  1:45 PM Clinton Hines DO [facility]   7/23/2025 10:45 AM Yanira Vidal MD [facility]

## 2025-05-28 NOTE — TELEPHONE ENCOUNTER
Patient called office requesting appointment for ED follow up.    ED visit date: 04/15/25    ED Location: [facility]    Diagnosis/Injury: Acute pain of right knee, Sprain of right knee    Provider on call: Dr. Clinton Hines DO    Routed to providers for recommendations.    Future Appointments   Date Time Provider Department Center   7/23/2025 10:45 AM Yanira Vidal MD [facility]

## 2025-05-29 ENCOUNTER — HOSPITAL ENCOUNTER (OUTPATIENT)
Dept: GENERAL RADIOLOGY | Age: 45
Discharge: HOME | End: 2025-05-31
Payer: COMMERCIAL

## 2025-05-29 ENCOUNTER — OFFICE VISIT (OUTPATIENT)
Age: 45
End: 2025-05-29
Payer: COMMERCIAL

## 2025-05-29 VITALS — DIASTOLIC BLOOD PRESSURE: 78 MMHG | OXYGEN SATURATION: 100 % | SYSTOLIC BLOOD PRESSURE: 122 MMHG | HEART RATE: 76 BPM

## 2025-05-29 DIAGNOSIS — M79.604 RIGHT LEG PAIN: Primary | ICD-10-CM

## 2025-05-29 DIAGNOSIS — R52 PAIN: ICD-10-CM

## 2025-05-29 DIAGNOSIS — Q85.00 NEUROFIBROMATOSIS (HCC): ICD-10-CM

## 2025-05-29 DIAGNOSIS — R52 PAIN: Primary | ICD-10-CM

## 2025-05-29 DIAGNOSIS — M89.9 BONE LESION: ICD-10-CM

## 2025-05-29 DIAGNOSIS — M79.604 RIGHT LEG PAIN: ICD-10-CM

## 2025-05-29 PROCEDURE — 73610 X-RAY EXAM OF ANKLE: CPT

## 2025-05-29 PROCEDURE — 99203 OFFICE O/P NEW LOW 30 MIN: CPT | Performed by: ORTHOPAEDIC SURGERY

## 2025-05-29 PROCEDURE — 73565 X-RAY EXAM OF KNEES: CPT

## 2025-05-29 PROCEDURE — 4004F PT TOBACCO SCREEN RCVD TLK: CPT | Performed by: ORTHOPAEDIC SURGERY

## 2025-05-29 PROCEDURE — G8427 DOCREV CUR MEDS BY ELIG CLIN: HCPCS | Performed by: ORTHOPAEDIC SURGERY

## 2025-05-29 PROCEDURE — G8419 CALC BMI OUT NRM PARAM NOF/U: HCPCS | Performed by: ORTHOPAEDIC SURGERY

## 2025-05-29 PROCEDURE — 73590 X-RAY EXAM OF LOWER LEG: CPT

## 2025-05-29 PROCEDURE — 99204 OFFICE O/P NEW MOD 45 MIN: CPT | Performed by: ORTHOPAEDIC SURGERY

## 2025-05-29 NOTE — PATIENT INSTRUCTIONS
You were ordered MRI of Right Femur, Right Tibia Fibula  by your Orthopedic Provider.  If you do not hear from that department please contact: MRI- [phone]    Call the office once you have MRI appointment to schedule appointment a couple weeks after MRI.    Please make sure your follow up appointment in office is scheduled shortly after the above testing is complete.  If your testing is completed significantly sooner than planned follow up contact office for appointment adjustment.

## 2025-05-29 NOTE — PROGRESS NOTES
Orthopaedic New Patient Note        Hannah Burnett is a 44 y.o. female, her YOB: 1980 with the following history as recorded in [facility]:    Patient Active Problem List    Diagnosis Date Noted    Right leg pain 2025    Bone lesion 2025    Neurofibromatosis (HCC) 2025    Abdominal pain, generalized 2024    Blood in stool 2024    Heartburn 2023    Indigestion 2023    Gastroesophageal reflux disease without esophagitis 2018    Status asthmaticus 2018    Mild intermittent asthma 2017    Tobacco abuse 2017    Anxiety 2017     No current outpatient medications on file.     No current facility-administered medications for this visit.     Allergies: Flagyl [metronidazole] and Tetracyclines & related  Past Medical History:   Diagnosis Date    Anxiety     Asthma     Bipolar disorder (HCC)     Depression     Diabetes mellitus (HCC)     Neurofibromatosis (HCC)     Obesity     Sleep apnea      Past Surgical History:   Procedure Laterality Date     SECTION      COLONOSCOPY N/A 2024    COLONOSCOPY POLYPECTOMY SNARE/COLD BIOPSY performed by [facility] Uribe [facility] at [facility]    COLONOSCOPY  2024    COLONOSCOPY POLYPECTOMY HOT BIOPSY performed by Henry Uribe MD at [facility]    COLONOSCOPY W/ POLYPECTOMY  2024    polyps--ravi    ESOPHAGOGASTRODUODENOSCOPY  2023    minimal gastritis--ravi    ESOPHAGOGASTRODUODENOSCOPY  2024    normal--ravi    FOOT SURGERY      TUBAL LIGATION      UPPER GASTROINTESTINAL ENDOSCOPY N/A 2023    EGD BIOPSY performed by Henry Uribe MD at [facility]    UPPER GASTROINTESTINAL ENDOSCOPY N/A 2024    ESOPHAGOGASTRODUODENOSCOPY BIOPSY performed by Henry Uribe MD at [facility]    WISDOM TOOTH EXTRACTION       Family History   Adopted: Yes   Problem Relation Age of Onset    Breast Cancer Mother     Diabetes Mother     Hypotension Mother     Dementia Mother

## 2025-06-02 ENCOUNTER — TELEPHONE (OUTPATIENT)
Age: 45
End: 2025-06-02

## 2025-06-02 NOTE — TELEPHONE ENCOUNTER
Pt called. She is requesting pain medication. Advised pt to take OTC Ibuprofen and Tylenol as needed for pain. Verbalized understanding

## 2025-07-03 ENCOUNTER — HOSPITAL ENCOUNTER (OUTPATIENT)
Dept: MRI IMAGING | Age: 45
Discharge: HOME | End: 2025-07-05
Attending: ORTHOPAEDIC SURGERY
Payer: COMMERCIAL

## 2025-07-03 DIAGNOSIS — M89.9 BONE LESION: ICD-10-CM

## 2025-07-03 PROCEDURE — A9577 INJ MULTIHANCE: HCPCS | Performed by: RADIOLOGY

## 2025-07-03 PROCEDURE — 73720 MRI LWR EXTREMITY W/O&W/DYE: CPT

## 2025-07-03 PROCEDURE — 6360000004 HC RX CONTRAST MEDICATION: Performed by: RADIOLOGY

## 2025-07-03 RX ADMIN — GADOBENATE DIMEGLUMINE 13 ML: 529 INJECTION, SOLUTION INTRAVENOUS at 12:51

## 2025-07-07 DIAGNOSIS — M89.9 BONE LESION: ICD-10-CM

## 2025-07-07 DIAGNOSIS — M79.604 RIGHT LEG PAIN: Primary | ICD-10-CM

## 2025-07-07 NOTE — PROGRESS NOTES
Per Dr Hines, patient referred to Dr Yrn jN in [place] for R tibial lesion. Called patient to inform her and she verbalized understanding and agreement. She can keep appointment tomorrow to discuss further with Dr Hines if needed.

## 2025-08-05 ENCOUNTER — TELEPHONE (OUTPATIENT)
Age: 45
End: 2025-08-05

## (undated) DEVICE — CONTAINER SPEC 60ML PH 7NEUTRAL BUFF FRMLN RDY TO USE

## (undated) DEVICE — GAUZE,SPONGE,4"X4",8PLY,STRL,LF,10/TRAY: Brand: MEDLINE

## (undated) DEVICE — BITEBLOCK 54FR W/ DENT RIM BLOX

## (undated) DEVICE — DEFENDO AIR WATER SUCTION AND BIOPSY VALVE KIT FOR  OLYMPUS: Brand: DEFENDO AIR/WATER/SUCTION AND BIOPSY VALVE

## (undated) DEVICE — FORCEPS BX L160CM JAW DIA2.4MM YEL L CAP W/ NDL DISP RAD

## (undated) DEVICE — SNARE ENDOSCP L240CM SHTH DIA2.4MM LOOP W30MM MIN WRK CHN

## (undated) DEVICE — TRAP POLYP ETRAP

## (undated) DEVICE — ELECTRODE PT RET AD L9FT HI MOIST COND ADH HYDRGEL CORDED

## (undated) DEVICE — Z DISCONTINUED NO SUB IDED TUBING ETCO2 AD L6.5FT NSL ORAL CVD PRNG NONFLARED TIP OVR

## (undated) DEVICE — SNARE VASC L240CM LOOP W10MM SHTH DIA2.4MM RND STIFF CLD

## (undated) DEVICE — CONNECTOR IRRIGATION AUXILIARY H2O JET W/ PRT MTL THRD HYDR